# Patient Record
Sex: FEMALE | Race: WHITE | Employment: OTHER
[De-identification: names, ages, dates, MRNs, and addresses within clinical notes are randomized per-mention and may not be internally consistent; named-entity substitution may affect disease eponyms.]

---

## 2017-01-10 PROBLEM — R13.10 DYSPHAGIA, UNSPECIFIED TYPE: Status: ACTIVE | Noted: 2017-01-10

## 2017-01-10 PROBLEM — I35.8 AORTIC VALVE SCLEROSIS: Status: ACTIVE | Noted: 2017-01-10

## 2017-01-20 PROCEDURE — 82746 ASSAY OF FOLIC ACID SERUM: CPT | Performed by: INTERNAL MEDICINE

## 2017-01-20 PROCEDURE — 82607 VITAMIN B-12: CPT | Performed by: INTERNAL MEDICINE

## 2017-03-07 PROCEDURE — 88305 TISSUE EXAM BY PATHOLOGIST: CPT | Performed by: INTERNAL MEDICINE

## 2017-07-06 ENCOUNTER — SURGERY (OUTPATIENT)
Age: 67
End: 2017-07-06

## 2017-07-06 ENCOUNTER — HOSPITAL ENCOUNTER (OUTPATIENT)
Facility: HOSPITAL | Age: 67
Setting detail: HOSPITAL OUTPATIENT SURGERY
Discharge: HOME | End: 2017-07-06
Attending: INTERNAL MEDICINE | Admitting: INTERNAL MEDICINE
Payer: MEDICARE

## 2017-07-06 VITALS
OXYGEN SATURATION: 100 % | HEIGHT: 63 IN | HEART RATE: 74 BPM | DIASTOLIC BLOOD PRESSURE: 84 MMHG | TEMPERATURE: 98 F | RESPIRATION RATE: 16 BRPM | SYSTOLIC BLOOD PRESSURE: 163 MMHG

## 2017-07-06 DIAGNOSIS — R13.10 DYSPHAGIA, UNSPECIFIED TYPE: ICD-10-CM

## 2017-07-06 PROCEDURE — 4A0B8BZ MEASUREMENT OF GASTROINTESTINAL PRESSURE, VIA NATURAL OR ARTIFICIAL OPENING ENDOSCOPIC: ICD-10-PCS | Performed by: INTERNAL MEDICINE

## 2017-07-07 PROCEDURE — 80048 BASIC METABOLIC PNL TOTAL CA: CPT | Performed by: INTERNAL MEDICINE

## 2017-07-07 PROCEDURE — 36415 COLL VENOUS BLD VENIPUNCTURE: CPT | Performed by: INTERNAL MEDICINE

## 2017-07-07 NOTE — H&P
HISTORY AND PHYSICAL FOR ENDOSCOPIC PROCEDURE      Tamar Borjas Patient Status:  [facility] Outpatient Surgery    1950 MRN [id number]   [facility] ENDOSCOPY Attending No att. providers found   [address] Day # 0 PCP Griselda Monte MD flatulence, vomiting blood, bloating, hernia, laxative use, food/milk intolerance, pain with bowel movement, hemorrhoids. General: Denies fatigue, chills/fever, night sweats, weight loss, loss of appetite, weight gain, sleep disturbance.   Neurological: Chelsea Coil loss.  Physical Exam:    Blood pressure (!) 163/84, pulse 74, temperature 98.2 °F (36.8 °C), temperature source Oral, resp. rate 16, height 5' 3\" (1.6 m), SpO2 100 %. General: Appears alert, oriented x3 and in no acute distress.   HEENT: Normal. No neck

## 2017-07-07 NOTE — OPERATIVE REPORT
[facility]                                                                                                        EGD Operative Report    Nain Cao Patient Status:  Hospital Outpatient Finney plans.   [facility]  7/7/2017  2:49 PM

## 2017-07-18 ENCOUNTER — LAB ENCOUNTER (OUTPATIENT)
Dept: LAB | Facility: HOSPITAL | Age: 67
DRG: 470 | End: 2017-07-18
Attending: ORTHOPAEDIC SURGERY
Payer: MEDICARE

## 2017-07-18 DIAGNOSIS — Z01.818 PRE-OP TESTING: ICD-10-CM

## 2017-07-18 LAB
ANTIBODY SCREEN: NEGATIVE
MRSA DNA SPEC QL NAA+PROBE: NEGATIVE
RH BLOOD TYPE: POSITIVE

## 2017-07-18 PROCEDURE — 87641 MR-STAPH DNA AMP PROBE: CPT

## 2017-07-18 PROCEDURE — 86901 BLOOD TYPING SEROLOGIC RH(D): CPT

## 2017-07-18 PROCEDURE — 86850 RBC ANTIBODY SCREEN: CPT

## 2017-07-18 PROCEDURE — 86900 BLOOD TYPING SEROLOGIC ABO: CPT

## 2017-07-18 PROCEDURE — 36415 COLL VENOUS BLD VENIPUNCTURE: CPT

## 2017-07-18 NOTE — H&P
[facility]    PATIENT'S NAME: Maty APARICIO Justin Jane   ATTENDING PHYSICIAN: Julien Kaufman MD   PATIENT ACCOUNT#:   [id number]    [facility]  MEDICAL RECORD #:   [id number]       YOB: 1950  ADMISSION DATE:       07/20 internal and external rotation. Her calves are nontender. She is neurologically intact. The left side is within normal limits.     LABORATORY DATA:  Radiograph, AP pelvis and AP and lateral of the right hip, shows end-stage primary osteoarthritis with ce

## 2017-07-20 ENCOUNTER — APPOINTMENT (OUTPATIENT)
Dept: GENERAL RADIOLOGY | Facility: HOSPITAL | Age: 67
DRG: 470 | End: 2017-07-20
Attending: PHYSICIAN ASSISTANT
Payer: MEDICARE

## 2017-07-20 ENCOUNTER — ANESTHESIA EVENT (OUTPATIENT)
Dept: SURGERY | Facility: HOSPITAL | Age: 67
DRG: 470 | End: 2017-07-20
Payer: MEDICARE

## 2017-07-20 ENCOUNTER — HOSPITAL ENCOUNTER (INPATIENT)
Facility: HOSPITAL | Age: 67
LOS: 2 days | Discharge: HOME | DRG: 470 | End: 2017-07-22
Attending: ORTHOPAEDIC SURGERY | Admitting: ORTHOPAEDIC SURGERY
Payer: MEDICARE

## 2017-07-20 ENCOUNTER — ANESTHESIA (OUTPATIENT)
Dept: SURGERY | Facility: HOSPITAL | Age: 67
DRG: 470 | End: 2017-07-20
Payer: MEDICARE

## 2017-07-20 ENCOUNTER — SURGERY (OUTPATIENT)
Age: 67
End: 2017-07-20

## 2017-07-20 DIAGNOSIS — M16.11 PRIMARY OSTEOARTHRITIS OF RIGHT HIP: ICD-10-CM

## 2017-07-20 DIAGNOSIS — Z01.818 PRE-OP TESTING: Primary | ICD-10-CM

## 2017-07-20 PROCEDURE — 88311 DECALCIFY TISSUE: CPT | Performed by: ORTHOPAEDIC SURGERY

## 2017-07-20 PROCEDURE — 73501 X-RAY EXAM HIP UNI 1 VIEW: CPT | Performed by: PHYSICIAN ASSISTANT

## 2017-07-20 PROCEDURE — 88305 TISSUE EXAM BY PATHOLOGIST: CPT | Performed by: ORTHOPAEDIC SURGERY

## 2017-07-20 PROCEDURE — C9290 INJ, BUPIVACAINE LIPOSOME: HCPCS | Performed by: ORTHOPAEDIC SURGERY

## 2017-07-20 PROCEDURE — 0SR904A REPLACEMENT OF RIGHT HIP JOINT WITH CERAMIC ON POLYETHYLENE SYNTHETIC SUBSTITUTE, UNCEMENTED, OPEN APPROACH: ICD-10-PCS | Performed by: ORTHOPAEDIC SURGERY

## 2017-07-20 PROCEDURE — 94770 HC CARBON DIOXIDE EXPIRED GAS DETERMINATION: CPT

## 2017-07-20 DEVICE — M/L TAP 11 EXT: Type: IMPLANTABLE DEVICE | Site: HIP | Status: FUNCTIONAL

## 2017-07-20 DEVICE — CONT LONG NEU LINER HH 32X50: Type: IMPLANTABLE DEVICE | Site: HIP | Status: FUNCTIONAL

## 2017-07-20 DEVICE — IMPLANTABLE DEVICE: Type: IMPLANTABLE DEVICE | Site: HIP | Status: FUNCTIONAL

## 2017-07-20 DEVICE — BIOLOX® DELTA, CERAMIC FEMORAL HEAD, M, Ø 32/0, TAPER 12/14
Type: IMPLANTABLE DEVICE | Site: HIP | Status: FUNCTIONAL
Brand: BIOLOX® DELTA

## 2017-07-20 RX ORDER — BUPIVACAINE HYDROCHLORIDE 7.5 MG/ML
INJECTION, SOLUTION INTRASPINAL AS NEEDED
Status: DISCONTINUED | OUTPATIENT
Start: 2017-07-20 | End: 2017-07-20 | Stop reason: SURG

## 2017-07-20 RX ORDER — LEVOTHYROXINE SODIUM 0.12 MG/1
125 TABLET ORAL
Status: DISCONTINUED | OUTPATIENT
Start: 2017-07-21 | End: 2017-07-22

## 2017-07-20 RX ORDER — ACETAMINOPHEN 325 MG/1
650 TABLET ORAL ONCE
Status: DISCONTINUED | OUTPATIENT
Start: 2017-07-20 | End: 2017-07-20 | Stop reason: HOSPADM

## 2017-07-20 RX ORDER — DIPHENHYDRAMINE HCL 25 MG
25 CAPSULE ORAL EVERY 4 HOURS PRN
Status: DISCONTINUED | OUTPATIENT
Start: 2017-07-20 | End: 2017-07-22

## 2017-07-20 RX ORDER — CELECOXIB 200 MG/1
200 CAPSULE ORAL 2 TIMES DAILY
Status: DISCONTINUED | OUTPATIENT
Start: 2017-07-21 | End: 2017-07-22

## 2017-07-20 RX ORDER — FAMOTIDINE 20 MG/1
20 TABLET ORAL ONCE
Status: DISCONTINUED | OUTPATIENT
Start: 2017-07-20 | End: 2017-07-20 | Stop reason: HOSPADM

## 2017-07-20 RX ORDER — CLINDAMYCIN PHOSPHATE 900 MG/50ML
900 INJECTION INTRAVENOUS EVERY 8 HOURS
Status: COMPLETED | OUTPATIENT
Start: 2017-07-20 | End: 2017-07-21

## 2017-07-20 RX ORDER — ONDANSETRON 2 MG/ML
4 INJECTION INTRAMUSCULAR; INTRAVENOUS ONCE AS NEEDED
Status: DISCONTINUED | OUTPATIENT
Start: 2017-07-20 | End: 2017-07-20 | Stop reason: HOSPADM

## 2017-07-20 RX ORDER — ASPIRIN 325 MG
325 TABLET ORAL 2 TIMES DAILY
Status: DISCONTINUED | OUTPATIENT
Start: 2017-07-20 | End: 2017-07-22

## 2017-07-20 RX ORDER — SODIUM CHLORIDE 0.9 % (FLUSH) 0.9 %
10 SYRINGE (ML) INJECTION AS NEEDED
Status: DISCONTINUED | OUTPATIENT
Start: 2017-07-20 | End: 2017-07-22

## 2017-07-20 RX ORDER — SODIUM PHOSPHATE, DIBASIC AND SODIUM PHOSPHATE, MONOBASIC 7; 19 G/133ML; G/133ML
1 ENEMA RECTAL ONCE AS NEEDED
Status: DISCONTINUED | OUTPATIENT
Start: 2017-07-20 | End: 2017-07-22

## 2017-07-20 RX ORDER — CLINDAMYCIN PHOSPHATE 900 MG/50ML
900 INJECTION INTRAVENOUS ONCE
Status: DISCONTINUED | OUTPATIENT
Start: 2017-07-20 | End: 2017-07-20 | Stop reason: HOSPADM

## 2017-07-20 RX ORDER — MORPHINE SULFATE 4 MG/ML
4 INJECTION, SOLUTION INTRAMUSCULAR; INTRAVENOUS EVERY 10 MIN PRN
Status: DISCONTINUED | OUTPATIENT
Start: 2017-07-20 | End: 2017-07-20 | Stop reason: HOSPADM

## 2017-07-20 RX ORDER — SODIUM CHLORIDE 9 MG/ML
INJECTION, SOLUTION INTRAVENOUS CONTINUOUS
Status: DISCONTINUED | OUTPATIENT
Start: 2017-07-20 | End: 2017-07-22

## 2017-07-20 RX ORDER — BISACODYL 10 MG
10 SUPPOSITORY, RECTAL RECTAL
Status: DISCONTINUED | OUTPATIENT
Start: 2017-07-20 | End: 2017-07-22

## 2017-07-20 RX ORDER — CLINDAMYCIN PHOSPHATE 150 MG/ML
INJECTION, SOLUTION INTRAVENOUS AS NEEDED
Status: DISCONTINUED | OUTPATIENT
Start: 2017-07-20 | End: 2017-07-20 | Stop reason: SURG

## 2017-07-20 RX ORDER — POLYETHYLENE GLYCOL 3350 17 G/17G
17 POWDER, FOR SOLUTION ORAL DAILY PRN
Status: DISCONTINUED | OUTPATIENT
Start: 2017-07-20 | End: 2017-07-22

## 2017-07-20 RX ORDER — METOCLOPRAMIDE 10 MG/1
10 TABLET ORAL ONCE
Status: DISCONTINUED | OUTPATIENT
Start: 2017-07-20 | End: 2017-07-20 | Stop reason: HOSPADM

## 2017-07-20 RX ORDER — ONDANSETRON 2 MG/ML
4 INJECTION INTRAMUSCULAR; INTRAVENOUS EVERY 4 HOURS PRN
Status: DISCONTINUED | OUTPATIENT
Start: 2017-07-20 | End: 2017-07-22

## 2017-07-20 RX ORDER — HALOPERIDOL 5 MG/ML
0.25 INJECTION INTRAMUSCULAR ONCE AS NEEDED
Status: DISCONTINUED | OUTPATIENT
Start: 2017-07-20 | End: 2017-07-20 | Stop reason: HOSPADM

## 2017-07-20 RX ORDER — DIPHENHYDRAMINE HYDROCHLORIDE 50 MG/ML
25 INJECTION INTRAMUSCULAR; INTRAVENOUS ONCE AS NEEDED
Status: ACTIVE | OUTPATIENT
Start: 2017-07-20 | End: 2017-07-20

## 2017-07-20 RX ORDER — MORPHINE SULFATE 1 MG/ML
INJECTION, SOLUTION EPIDURAL; INTRATHECAL; INTRAVENOUS AS NEEDED
Status: DISCONTINUED | OUTPATIENT
Start: 2017-07-20 | End: 2017-07-20 | Stop reason: SURG

## 2017-07-20 RX ORDER — SODIUM CHLORIDE, SODIUM LACTATE, POTASSIUM CHLORIDE, CALCIUM CHLORIDE 600; 310; 30; 20 MG/100ML; MG/100ML; MG/100ML; MG/100ML
INJECTION, SOLUTION INTRAVENOUS CONTINUOUS
Status: DISCONTINUED | OUTPATIENT
Start: 2017-07-20 | End: 2017-07-22

## 2017-07-20 RX ORDER — ACETAMINOPHEN 325 MG/1
650 TABLET ORAL EVERY 6 HOURS PRN
Status: DISCONTINUED | OUTPATIENT
Start: 2017-07-20 | End: 2017-07-22

## 2017-07-20 RX ORDER — DIPHENHYDRAMINE HYDROCHLORIDE 50 MG/ML
12.5 INJECTION INTRAMUSCULAR; INTRAVENOUS EVERY 4 HOURS PRN
Status: DISCONTINUED | OUTPATIENT
Start: 2017-07-20 | End: 2017-07-22

## 2017-07-20 RX ORDER — GABAPENTIN 300 MG/1
300 CAPSULE ORAL NIGHTLY
Status: DISCONTINUED | OUTPATIENT
Start: 2017-07-20 | End: 2017-07-22

## 2017-07-20 RX ORDER — MORPHINE SULFATE 15 MG/1
15 TABLET, FILM COATED, EXTENDED RELEASE ORAL ONCE
Status: ON HOLD | COMMUNITY
End: 2017-07-22

## 2017-07-20 RX ORDER — HYDROMORPHONE HYDROCHLORIDE 1 MG/ML
0.2 INJECTION, SOLUTION INTRAMUSCULAR; INTRAVENOUS; SUBCUTANEOUS EVERY 5 MIN PRN
Status: DISCONTINUED | OUTPATIENT
Start: 2017-07-20 | End: 2017-07-20 | Stop reason: HOSPADM

## 2017-07-20 RX ORDER — NALOXONE HYDROCHLORIDE 0.4 MG/ML
80 INJECTION, SOLUTION INTRAMUSCULAR; INTRAVENOUS; SUBCUTANEOUS AS NEEDED
Status: DISCONTINUED | OUTPATIENT
Start: 2017-07-20 | End: 2017-07-20 | Stop reason: HOSPADM

## 2017-07-20 RX ORDER — MORPHINE SULFATE 2 MG/ML
2 INJECTION, SOLUTION INTRAMUSCULAR; INTRAVENOUS EVERY 10 MIN PRN
Status: DISCONTINUED | OUTPATIENT
Start: 2017-07-20 | End: 2017-07-20 | Stop reason: HOSPADM

## 2017-07-20 RX ORDER — MIDAZOLAM HYDROCHLORIDE 1 MG/ML
INJECTION INTRAMUSCULAR; INTRAVENOUS AS NEEDED
Status: DISCONTINUED | OUTPATIENT
Start: 2017-07-20 | End: 2017-07-20 | Stop reason: SURG

## 2017-07-20 RX ORDER — HYDROCODONE BITARTRATE AND ACETAMINOPHEN 5; 325 MG/1; MG/1
2 TABLET ORAL AS NEEDED
Status: DISCONTINUED | OUTPATIENT
Start: 2017-07-20 | End: 2017-07-20 | Stop reason: HOSPADM

## 2017-07-20 RX ORDER — HYDROCODONE BITARTRATE AND ACETAMINOPHEN 10; 325 MG/1; MG/1
1 TABLET ORAL EVERY 4 HOURS PRN
Status: DISCONTINUED | OUTPATIENT
Start: 2017-07-21 | End: 2017-07-22

## 2017-07-20 RX ORDER — HYDROCODONE BITARTRATE AND ACETAMINOPHEN 5; 325 MG/1; MG/1
1 TABLET ORAL AS NEEDED
Status: DISCONTINUED | OUTPATIENT
Start: 2017-07-20 | End: 2017-07-20 | Stop reason: HOSPADM

## 2017-07-20 RX ORDER — HYDROMORPHONE HYDROCHLORIDE 1 MG/ML
0.6 INJECTION, SOLUTION INTRAMUSCULAR; INTRAVENOUS; SUBCUTANEOUS EVERY 5 MIN PRN
Status: DISCONTINUED | OUTPATIENT
Start: 2017-07-20 | End: 2017-07-20 | Stop reason: HOSPADM

## 2017-07-20 RX ORDER — METOCLOPRAMIDE HYDROCHLORIDE 5 MG/ML
10 INJECTION INTRAMUSCULAR; INTRAVENOUS EVERY 6 HOURS PRN
Status: DISCONTINUED | OUTPATIENT
Start: 2017-07-20 | End: 2017-07-22

## 2017-07-20 RX ORDER — HYDROCODONE BITARTRATE AND ACETAMINOPHEN 10; 325 MG/1; MG/1
2 TABLET ORAL EVERY 4 HOURS PRN
Status: DISCONTINUED | OUTPATIENT
Start: 2017-07-21 | End: 2017-07-22

## 2017-07-20 RX ORDER — DOCUSATE SODIUM 100 MG/1
100 CAPSULE, LIQUID FILLED ORAL 2 TIMES DAILY
Status: DISCONTINUED | OUTPATIENT
Start: 2017-07-20 | End: 2017-07-22

## 2017-07-20 RX ORDER — MORPHINE SULFATE 4 MG/ML
6 INJECTION, SOLUTION INTRAMUSCULAR; INTRAVENOUS EVERY 10 MIN PRN
Status: DISCONTINUED | OUTPATIENT
Start: 2017-07-20 | End: 2017-07-20 | Stop reason: HOSPADM

## 2017-07-20 RX ORDER — SENNOSIDES 8.6 MG
17.2 TABLET ORAL NIGHTLY
Status: DISCONTINUED | OUTPATIENT
Start: 2017-07-20 | End: 2017-07-22

## 2017-07-20 RX ORDER — HYDROMORPHONE HYDROCHLORIDE 1 MG/ML
0.4 INJECTION, SOLUTION INTRAMUSCULAR; INTRAVENOUS; SUBCUTANEOUS EVERY 5 MIN PRN
Status: DISCONTINUED | OUTPATIENT
Start: 2017-07-20 | End: 2017-07-20 | Stop reason: HOSPADM

## 2017-07-20 RX ADMIN — MORPHINE SULFATE 0.3 MG: 1 INJECTION, SOLUTION EPIDURAL; INTRATHECAL; INTRAVENOUS at 13:25:00

## 2017-07-20 RX ADMIN — BUPIVACAINE HYDROCHLORIDE 1.5 ML: 7.5 INJECTION, SOLUTION INTRASPINAL at 13:25:00

## 2017-07-20 RX ADMIN — MIDAZOLAM HYDROCHLORIDE 2 MG: 1 INJECTION INTRAMUSCULAR; INTRAVENOUS at 13:12:00

## 2017-07-20 RX ADMIN — SODIUM CHLORIDE, SODIUM LACTATE, POTASSIUM CHLORIDE, CALCIUM CHLORIDE: 600; 310; 30; 20 INJECTION, SOLUTION INTRAVENOUS at 14:07:00

## 2017-07-20 RX ADMIN — SODIUM CHLORIDE, SODIUM LACTATE, POTASSIUM CHLORIDE, CALCIUM CHLORIDE: 600; 310; 30; 20 INJECTION, SOLUTION INTRAVENOUS at 13:11:00

## 2017-07-20 RX ADMIN — CLINDAMYCIN PHOSPHATE 900 MG: 150 INJECTION, SOLUTION INTRAVENOUS at 13:25:00

## 2017-07-20 NOTE — BRIEF OP NOTE
Pre-Operative Diagnosis: Primary osteoarthritis of right hip [M16.11]     Post-Operative Diagnosis: Primary osteoarthritis of right hip [M16.11]     Procedure Performed:   Procedure(s):  RIGHT TOTAL HIP ARTHROPLASTY     Surgeon(s) and Role:     * Nir

## 2017-07-20 NOTE — INTERVAL H&P NOTE
Pre-op Diagnosis: Primary osteoarthritis of right hip [M16.11]    The above referenced H&P was reviewed by Thalia Hartman MD on 7/20/2017, the patient was examined and no significant changes have occurred in the patient's condition since the H&P was pe

## 2017-07-20 NOTE — ANESTHESIA PREPROCEDURE EVALUATION
Anesthesia PreOp Note    HPI:     Marcos Falcon is a 78year old female who presents for preoperative consultation requested by: Dean Lamar MD    Date of Surgery: 7/20/2017    Procedure(s):  HIP TOTAL REPLACEMENT  Indication: Primary osteoar AFTER SURGERY Disp: 34 capsule Rfl: 0 7/20/2017 at 0800   gabapentin 300 MG Oral Cap 1 capsule the morning prior to surgery, 1 capsule the morning of surgery, 1 capsule at bedtime each evening after surgery.  Disp: 32 capsule Rfl: 0 7/20/2017 at 0830   Levo file.      Pcn [Penicillins]       Swelling  Seasonal                    Family History   Problem Relation Age of Onset   • Other [OTHER] Father      CVA   • Other [OTHER] Mother      RA   • Other [OTHER] Sister      school fire   • Diabetes Brother    • C negative ROS and normal exam  Exercise tolerance: good    Neuro/Psych - negative ROS     GI/Hepatic/Renal - negative ROS     Endo/Other - negative ROS   Abdominal  - normal exam             Anesthesia Plan:   ASA:  2  Plan:   Spinal and MAC  Monitors and L

## 2017-07-20 NOTE — ANESTHESIA PROCEDURE NOTES
Spinal Block  Performed by: Shelly Gibbons by: Akhil Proctor     Start Time:  7/20/2017 1:15 PM  End Time:  7/20/2017 1:25 PM  Site identification: surface landmarks    Reason for Block: at surgeon's request, post-op pain management and gibbs

## 2017-07-20 NOTE — ANESTHESIA POSTPROCEDURE EVALUATION
Patient:  Kathia Mittal    Procedure Summary     Date:  07/20/17 Room / Location:  [facility] OR  / [facility] MAIN OR    Anesthesia Start:  0581 Anesthesia Stop:  7893    Procedure:  HIP TOTAL REPLACEMENT (Right Hip) Diagnosis:       Primary osteoarthritis

## 2017-07-21 LAB
ANION GAP SERPL CALC-SCNC: 3 MMOL/L (ref 0–18)
BASOPHILS # BLD: 0 K/UL (ref 0–0.2)
BASOPHILS NFR BLD: 0 %
BUN SERPL-MCNC: 12 MG/DL (ref 8–20)
BUN/CREAT SERPL: 17.6 (ref 10–20)
CALCIUM SERPL-MCNC: 8.6 MG/DL (ref 8.5–10.5)
CHLORIDE SERPL-SCNC: 104 MMOL/L (ref 95–110)
CO2 SERPL-SCNC: 29 MMOL/L (ref 22–32)
CREAT SERPL-MCNC: 0.68 MG/DL (ref 0.5–1.5)
EOSINOPHIL # BLD: 0.1 K/UL (ref 0–0.7)
EOSINOPHIL NFR BLD: 1 %
ERYTHROCYTE [DISTWIDTH] IN BLOOD BY AUTOMATED COUNT: 13.3 % (ref 11–15)
GLUCOSE SERPL-MCNC: 103 MG/DL (ref 70–99)
HCT VFR BLD AUTO: 34.7 % (ref 35–48)
HGB BLD-MCNC: 11.7 G/DL (ref 12–16)
LYMPHOCYTES # BLD: 1.5 K/UL (ref 1–4)
LYMPHOCYTES NFR BLD: 25 %
MCH RBC QN AUTO: 32 PG (ref 27–32)
MCHC RBC AUTO-ENTMCNC: 33.6 G/DL (ref 32–37)
MCV RBC AUTO: 95.2 FL (ref 80–100)
MONOCYTES # BLD: 0.6 K/UL (ref 0–1)
MONOCYTES NFR BLD: 10 %
NEUTROPHILS # BLD AUTO: 3.8 K/UL (ref 1.8–7.7)
NEUTROPHILS NFR BLD: 64 %
OSMOLALITY UR CALC.SUM OF ELEC: 282 MOSM/KG (ref 275–295)
PLATELET # BLD AUTO: 181 K/UL (ref 140–400)
PMV BLD AUTO: 8.3 FL (ref 7.4–10.3)
POTASSIUM SERPL-SCNC: 4.3 MMOL/L (ref 3.3–5.1)
RBC # BLD AUTO: 3.64 M/UL (ref 3.7–5.4)
SODIUM SERPL-SCNC: 136 MMOL/L (ref 136–144)
WBC # BLD AUTO: 6 K/UL (ref 4–11)

## 2017-07-21 PROCEDURE — 97162 PT EVAL MOD COMPLEX 30 MIN: CPT

## 2017-07-21 PROCEDURE — 94770 HC CARBON DIOXIDE EXPIRED GAS DETERMINATION: CPT

## 2017-07-21 PROCEDURE — 97116 GAIT TRAINING THERAPY: CPT

## 2017-07-21 PROCEDURE — 80048 BASIC METABOLIC PNL TOTAL CA: CPT | Performed by: INTERNAL MEDICINE

## 2017-07-21 PROCEDURE — 97535 SELF CARE MNGMENT TRAINING: CPT

## 2017-07-21 PROCEDURE — 85025 COMPLETE CBC W/AUTO DIFF WBC: CPT | Performed by: INTERNAL MEDICINE

## 2017-07-21 PROCEDURE — 97165 OT EVAL LOW COMPLEX 30 MIN: CPT

## 2017-07-21 NOTE — PLAN OF CARE
Diabetes/Glucose Control    • Glucose maintained within prescribed range Progressing        DISCHARGE PLANNING    • Discharge to home or other facility with appropriate resources Progressing        HEMATOLOGIC - ADULT    • Free from bleeding injury Neena

## 2017-07-21 NOTE — ANESTHESIA POST-OP FOLLOW-UP NOTE
Ms. Norma Bloom seen POD#1 after receiving Duramorph spinal for right total hip replacement.  States that she is very satisfied with her anesthetic care, was pleased that her anesthesiologist Dr. Jass Hernandez was patient with her when explaining the diffe

## 2017-07-21 NOTE — H&P
[facility]    History and Physical    Amrik Birmingham Patient Status:  Inpatient    1950 MRN [id number]   Location [facility] 4W/SW/SE Attending Liv Ayala MD   Psychiatric Day # 1 PCP Oscar Mckenzie MD     Date:  7 Cap TAKE 2 CAPS THE DAY PRIOR TO SURGERY, 2 CAPS THE MORNING OF SURGERY WITH A SIP OF WATER, 1 CAP DAILY AFTER SURGERY   gabapentin 300 MG Oral Cap 1 capsule the morning prior to surgery, 1 capsule the morning of surgery, 1 capsule at bedtime each evening Pulmonary/Chest: Effort normal and breath sounds normal. No respiratory distress. She has no wheezes. She has no rales. Abdominal: Soft. She exhibits no distension and no mass. There is no tenderness.    Musculoskeletal:   Dry and intact dressing is in

## 2017-07-21 NOTE — PROGRESS NOTES
POD#1 s/p Right SHARAD  Good pain control, No nausea    PE: Dressing clean and dry  Calf's non tender, DNVI    Xray reviewed  Labs stable    Imp: Good early POD#1 recovery s/p Right SHARAD    Plan: DC Christine  Rehab as tolerated with posterior hip precautions  325

## 2017-07-21 NOTE — PHYSICAL THERAPY NOTE
PHYSICAL THERAPY HIP EVALUATION - INPATIENT     Room Number: 407/407-A  Evaluation Date: 7/21/2017  Type of Evaluation: Initial  Physician Order: PT Eval and Treat    Presenting Problem: R SHARAD  Reason for Therapy: Mobility Dysfunction and Discharge Mateon PFO or ASD 1/9/2016   • Bursitis - right hip 6/8/2011   • Disorder of thyroid    • H/O Vitamin D Deficiency 6/8/2011   • Hypothyroid 8/10/2010   • Left ventricular diastolic dysfunction with preserved systolic function 2/5/9233   • Osteoarthritis    • Oste bedclothes, sheets and blankets)?: A Little   -   Sitting down on and standing up from a chair with arms (e.g., wheelchair, bedside commode, etc.): A Little   -   Moving from lying on back to sitting on the side of the bed?: A Little   How much help from a independently   Goal #5   Current Status    Goal #6 Patient independently performs home exercise program for ROM/strengthening per the instructions provided in preparation for discharge.    Goal #6  Current Status

## 2017-07-21 NOTE — OCCUPATIONAL THERAPY NOTE
OCCUPATIONAL THERAPY QUICK EVALUATION - INPATIENT    Room Number: 407/407-A  Evaluation Date: 7/21/2017     Type of Evaluation: Initial       Physician Order: IP Consult to Occupational Therapy  Reason for Therapy:  ADL/IADL Dysfunction and Discharge Plann within functional limits     Upper extremity strength is within functional limits     NEUROLOGICAL FINDINGS                   ACTIVITIES OF DAILY LIVING ASSESSMENT  AM-PAC ‘6-Clicks’ Inpatient Daily Activity Short Form  How much help from another person do with no skilled Occupational Therapy needs at this time. Patient discharged from Occupational Therapy services. Please re-order if a new functional limitation presents during this admission.     Patient was able to achieve the following goals:  Patient ab

## 2017-07-21 NOTE — DISCHARGE PLANNING
JAIME met with the pt. At bedside. The pt. Lives with her  in a 3rd floor condo with elevators. The pt. Reports being independent prior to admission with adls, ambulation and driving. The pt's  also drives. The pt.  Has 2 children both livin

## 2017-07-21 NOTE — PHYSICAL THERAPY NOTE
PHYSICAL THERAPY HIP TREATMENT NOTE - INPATIENT    Room Number: 407/407-A            Presenting Problem: R SHARAD    Problem List  Principal Problem:    Osteoarthritis of hip  Active Problems:    Hypothyroid    Vitamin deficiency      ASSESSMENT   Patient rec bedclothes, sheets and blankets)?: A Little   -   Sitting down on and standing up from a chair with arms (e.g., wheelchair, bedside commode, etc.): A Little   -   Moving from lying on back to sitting on the side of the bed?: A Little   How much help from a #6  Current Status In progress

## 2017-07-21 NOTE — OPERATIVE REPORT
[facility] [place] [place]    PATIENT'S NAME: Maty APARICIO Justin Jane   ATTENDING PHYSICIAN: Julien Manzo MD   OPERATING PHYSICIAN: Julien Manzo MD   PATIENT ACCOUNT#:   [id number]    LOCATION:  [address] [place] [address] #:   [id number]       DATE in a right side superolateral position with all pressure points well padded and the pelvis well stabilized. The right lower extremity was prepped and draped in the usual sterile fashion. Time-out was performed.   The procedure was begun with a 12 cm incis neck cut. The trials were then used and it was found an extended offset plus 0 head recreated the desired leg length and offset while obtaining excellent stability. The trials were then removed.   An M/L taper extended neck size 11 was then impacted into

## 2017-07-22 VITALS
HEIGHT: 64 IN | SYSTOLIC BLOOD PRESSURE: 101 MMHG | DIASTOLIC BLOOD PRESSURE: 51 MMHG | BODY MASS INDEX: 25.41 KG/M2 | OXYGEN SATURATION: 97 % | RESPIRATION RATE: 18 BRPM | TEMPERATURE: 98 F | HEART RATE: 75 BPM | WEIGHT: 148.81 LBS

## 2017-07-22 LAB
ERYTHROCYTE [DISTWIDTH] IN BLOOD BY AUTOMATED COUNT: 13.5 % (ref 11–15)
HCT VFR BLD AUTO: 32.6 % (ref 35–48)
HGB BLD-MCNC: 10.9 G/DL (ref 12–16)
MCH RBC QN AUTO: 31.9 PG (ref 27–32)
MCHC RBC AUTO-ENTMCNC: 33.5 G/DL (ref 32–37)
MCV RBC AUTO: 95.1 FL (ref 80–100)
PLATELET # BLD AUTO: 165 K/UL (ref 140–400)
PMV BLD AUTO: 8.6 FL (ref 7.4–10.3)
RBC # BLD AUTO: 3.42 M/UL (ref 3.7–5.4)
WBC # BLD AUTO: 7.6 K/UL (ref 4–11)

## 2017-07-22 PROCEDURE — 85027 COMPLETE CBC AUTOMATED: CPT | Performed by: PHYSICIAN ASSISTANT

## 2017-07-22 PROCEDURE — 97110 THERAPEUTIC EXERCISES: CPT

## 2017-07-22 PROCEDURE — 97116 GAIT TRAINING THERAPY: CPT

## 2017-07-22 NOTE — DISCHARGE SUMMARY
[facility]    Discharge Summary    Alexandru Lisa Patient Status:  Inpatient    1950 MRN [id number]   Location [facility] 4W/SW/SE Attending Timo Paniagua MD   Hosp Day # 2 PCP Griselda Monte MD     Date of A Quantity:  90 tablet  Refills:  2     MULTIVITAMIN TAB/CAP      Take 1 tablet by mouth daily. Refills:  0     VITAMIN D OR      Take 5,000 Units by mouth daily.    Refills:  0        STOP taking these medications    aspirin 81 MG Tabs        celecoxib 200

## 2017-07-22 NOTE — PROGRESS NOTES
POD#2 s/p Right SHARAD  Good early recovery  Pain well controlled on orals    Labs: stable    PE: Dressing clean and dry  Calf's non tender, DNVI    Imp: ortho stable POD#2 s/p Right SHARAD    Plan: DC home with outpt physical therapy - script written  325 ASA f

## 2017-07-22 NOTE — PHYSICAL THERAPY NOTE
PHYSICAL THERAPY HIP TREATMENT NOTE - INPATIENT      Room Number: 407/407-A     Session:        Presenting Problem: R SHARAD    Problem List  Principal Problem:    Osteoarthritis of hip  Active Problems:    Hypothyroid    Vitamin deficiency      Past Medical a bed to a chair (including a wheelchair)?: None   -   Need to walk in hospital room?: None   -   Climbing 3-5 steps with a railing?: A Little       AM-PAC Score:  Raw Score: 22   PT Approx Degree of Impairment Score: 20.91%   Standardized Score (AM-PAC Sc assistance level: modified independent with rolling walker    Goal #3   Current Status 200ft with rolling walker with Supervision   Goal #4     Goal #4   Current Status     Goal #5 Patient verbalizes and/or demonstrates all precautions and safety concerns

## 2017-07-22 NOTE — PLAN OF CARE
Diabetes/Glucose Control    • Glucose maintained within prescribed range Adequate for Discharge        DISCHARGE PLANNING    • Discharge to home or other facility with appropriate resources Adequate for Discharge        HEMATOLOGIC - ADULT    • Free from b

## 2017-07-26 PROBLEM — Z96.641 S/P HIP REPLACEMENT, RIGHT: Status: ACTIVE | Noted: 2017-07-26

## 2017-07-26 PROBLEM — Z47.89 ORTHOPEDIC AFTERCARE: Status: ACTIVE | Noted: 2017-07-26

## 2017-08-02 PROBLEM — M19.011 PRIMARY OSTEOARTHRITIS OF RIGHT SHOULDER: Status: ACTIVE | Noted: 2017-08-02

## 2018-01-12 PROBLEM — Z12.39 SCREENING FOR BREAST CANCER: Status: ACTIVE | Noted: 2018-01-12

## 2018-01-12 PROBLEM — R13.10 DYSPHAGIA, UNSPECIFIED TYPE: Status: RESOLVED | Noted: 2017-01-10 | Resolved: 2018-01-12

## 2018-01-12 PROBLEM — B35.1 ONYCHOMYCOSIS: Status: ACTIVE | Noted: 2018-01-12

## 2018-01-12 PROBLEM — R00.2 POUNDING HEARTBEAT: Status: ACTIVE | Noted: 2018-01-12

## 2018-01-18 PROBLEM — R59.0 PELVIC LYMPHADENOPATHY: Status: ACTIVE | Noted: 2018-01-18

## 2018-01-18 PROBLEM — R74.8 ELEVATED LIVER ENZYMES: Status: ACTIVE | Noted: 2018-01-18

## 2018-03-09 PROBLEM — R19.09 MASS OF RIGHT INGUINAL REGION: Status: ACTIVE | Noted: 2018-03-09

## 2018-04-09 PROCEDURE — 88304 TISSUE EXAM BY PATHOLOGIST: CPT | Performed by: SURGERY

## 2018-10-02 PROBLEM — L73.8 SEBACEOUS HYPERPLASIA OF FACE: Status: ACTIVE | Noted: 2018-10-02

## 2019-01-08 PROBLEM — Z00.00 ANNUAL PHYSICAL EXAM: Status: ACTIVE | Noted: 2019-01-08

## 2019-01-09 PROBLEM — E78.49 OTHER HYPERLIPIDEMIA: Status: ACTIVE | Noted: 2019-01-09

## 2019-02-07 ENCOUNTER — HOSPITAL ENCOUNTER (OUTPATIENT)
Dept: BONE DENSITY | Facility: HOSPITAL | Age: 69
Discharge: HOME | End: 2019-02-07
Attending: INTERNAL MEDICINE
Payer: MEDICARE

## 2019-02-07 ENCOUNTER — HOSPITAL ENCOUNTER (OUTPATIENT)
Dept: MAMMOGRAPHY | Facility: HOSPITAL | Age: 69
Discharge: HOME | End: 2019-02-07
Attending: INTERNAL MEDICINE
Payer: MEDICARE

## 2019-02-07 DIAGNOSIS — M85.80 OSTEOPENIA, UNSPECIFIED LOCATION: ICD-10-CM

## 2019-02-07 DIAGNOSIS — Z12.39 BREAST CANCER SCREENING: ICD-10-CM

## 2019-02-07 PROCEDURE — 77080 DXA BONE DENSITY AXIAL: CPT | Performed by: INTERNAL MEDICINE

## 2019-02-07 PROCEDURE — 77067 SCR MAMMO BI INCL CAD: CPT | Performed by: INTERNAL MEDICINE

## 2019-02-07 PROCEDURE — 77063 BREAST TOMOSYNTHESIS BI: CPT | Performed by: INTERNAL MEDICINE

## 2019-03-15 ENCOUNTER — HOSPITAL ENCOUNTER (OUTPATIENT)
Dept: CT IMAGING | Age: 69
Discharge: HOME | End: 2019-03-15
Attending: INTERNAL MEDICINE

## 2019-03-15 DIAGNOSIS — Z13.9 SCREENING PROCEDURE: ICD-10-CM

## 2019-03-15 NOTE — PROGRESS NOTES
Pt seen at [facility], [facility] for CTHS:  PRELIMINARY SCORE= 0.0  BP= 152/78  Cholestec labs as follows: Labs from 1/8/19  TC= 249  HDL= 94  LDL= 143  TG= 59  GLUCOSE= 83    Patient to be scheduled for the free PV screening.     All results and risk factors disc

## 2020-01-10 PROBLEM — K64.8 INTERNAL HEMORRHOIDS: Status: ACTIVE | Noted: 2020-01-10

## 2021-01-20 PROBLEM — K64.8 INTERNAL HEMORRHOIDS: Status: RESOLVED | Noted: 2020-01-10 | Resolved: 2021-01-20

## 2021-01-20 PROBLEM — L73.8 SEBACEOUS HYPERPLASIA OF FACE: Status: RESOLVED | Noted: 2018-10-02 | Resolved: 2021-01-20

## 2021-05-21 ENCOUNTER — HOSPITAL ENCOUNTER (OUTPATIENT)
Facility: HOSPITAL | Age: 71
Setting detail: OBSERVATION
Discharge: HOME | End: 2021-05-22
Attending: EMERGENCY MEDICINE | Admitting: INTERNAL MEDICINE
Payer: MEDICARE

## 2021-05-21 ENCOUNTER — APPOINTMENT (OUTPATIENT)
Dept: CT IMAGING | Facility: HOSPITAL | Age: 71
End: 2021-05-21
Payer: MEDICARE

## 2021-05-21 ENCOUNTER — APPOINTMENT (OUTPATIENT)
Dept: GENERAL RADIOLOGY | Facility: HOSPITAL | Age: 71
End: 2021-05-21
Attending: EMERGENCY MEDICINE
Payer: MEDICARE

## 2021-05-21 DIAGNOSIS — R41.82 ALTERED MENTAL STATUS, UNSPECIFIED ALTERED MENTAL STATUS TYPE: Primary | ICD-10-CM

## 2021-05-21 DIAGNOSIS — S09.90XA INJURY OF HEAD, INITIAL ENCOUNTER: ICD-10-CM

## 2021-05-21 DIAGNOSIS — G45.4 TRANSIENT GLOBAL AMNESIA: ICD-10-CM
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
## 2021-05-21 DIAGNOSIS — R41.0 DISORIENTATION: ICD-10-CM

## 2021-05-21 PROCEDURE — 72125 CT NECK SPINE W/O DYE: CPT

## 2021-05-21 PROCEDURE — 71045 X-RAY EXAM CHEST 1 VIEW: CPT | Performed by: EMERGENCY MEDICINE

## 2021-05-21 PROCEDURE — 99204 OFFICE O/P NEW MOD 45 MIN: CPT | Performed by: OTHER

## 2021-05-21 PROCEDURE — 70450 CT HEAD/BRAIN W/O DYE: CPT

## 2021-05-21 PROCEDURE — 73130 X-RAY EXAM OF HAND: CPT | Performed by: EMERGENCY MEDICINE

## 2021-05-21 RX ORDER — ONDANSETRON 2 MG/ML
4 INJECTION INTRAMUSCULAR; INTRAVENOUS EVERY 6 HOURS PRN
Status: DISCONTINUED | OUTPATIENT
Start: 2021-05-21 | End: 2021-05-22

## 2021-05-21 RX ORDER — ATORVASTATIN CALCIUM 80 MG/1
80 TABLET, FILM COATED ORAL NIGHTLY
Status: DISCONTINUED | OUTPATIENT
Start: 2021-05-21 | End: 2021-05-22

## 2021-05-21 RX ORDER — ACETAMINOPHEN 325 MG/1
650 TABLET ORAL EVERY 4 HOURS PRN
Status: DISCONTINUED | OUTPATIENT
Start: 2021-05-21 | End: 2021-05-22

## 2021-05-21 RX ORDER — HYDRALAZINE HYDROCHLORIDE 20 MG/ML
10 INJECTION INTRAMUSCULAR; INTRAVENOUS EVERY 2 HOUR PRN
Status: DISCONTINUED | OUTPATIENT
Start: 2021-05-21 | End: 2021-05-22

## 2021-05-21 RX ORDER — CHOLECALCIFEROL (VITAMIN D3) 125 MCG
1000 CAPSULE ORAL DAILY
Status: DISCONTINUED | OUTPATIENT
Start: 2021-05-21 | End: 2021-05-22

## 2021-05-21 RX ORDER — HYDROCODONE BITARTRATE AND ACETAMINOPHEN 5; 325 MG/1; MG/1
1 TABLET ORAL EVERY 4 HOURS PRN
Status: DISCONTINUED | OUTPATIENT
Start: 2021-05-21 | End: 2021-05-22

## 2021-05-21 RX ORDER — MELATONIN
3 NIGHTLY PRN
Status: DISCONTINUED | OUTPATIENT
Start: 2021-05-21 | End: 2021-05-22

## 2021-05-21 RX ORDER — FOLIC ACID 1 MG/1
1 TABLET ORAL DAILY
Status: DISCONTINUED | OUTPATIENT
Start: 2021-05-21 | End: 2021-05-22

## 2021-05-21 RX ORDER — LABETALOL HYDROCHLORIDE 5 MG/ML
10 INJECTION, SOLUTION INTRAVENOUS EVERY 10 MIN PRN
Status: DISCONTINUED | OUTPATIENT
Start: 2021-05-21 | End: 2021-05-22

## 2021-05-21 RX ORDER — DOCUSATE SODIUM 100 MG/1
100 CAPSULE, LIQUID FILLED ORAL 2 TIMES DAILY PRN
Status: DISCONTINUED | OUTPATIENT
Start: 2021-05-21 | End: 2021-05-22

## 2021-05-21 RX ORDER — ACETAMINOPHEN 650 MG/1
650 SUPPOSITORY RECTAL EVERY 4 HOURS PRN
Status: DISCONTINUED | OUTPATIENT
Start: 2021-05-21 | End: 2021-05-22

## 2021-05-21 RX ORDER — HYDROCODONE BITARTRATE AND ACETAMINOPHEN 5; 325 MG/1; MG/1
2 TABLET ORAL EVERY 4 HOURS PRN
Status: DISCONTINUED | OUTPATIENT
Start: 2021-05-21 | End: 2021-05-22

## 2021-05-21 RX ORDER — ASPIRIN 81 MG/1
81 TABLET, CHEWABLE ORAL ONCE
Status: COMPLETED | OUTPATIENT
Start: 2021-05-21 | End: 2021-05-21

## 2021-05-21 RX ORDER — HEPARIN SODIUM 5000 [USP'U]/ML
5000 INJECTION, SOLUTION INTRAVENOUS; SUBCUTANEOUS EVERY 12 HOURS SCHEDULED
Status: DISCONTINUED | OUTPATIENT
Start: 2021-05-21 | End: 2021-05-22

## 2021-05-21 RX ORDER — METOCLOPRAMIDE HYDROCHLORIDE 5 MG/ML
10 INJECTION INTRAMUSCULAR; INTRAVENOUS EVERY 8 HOURS PRN
Status: DISCONTINUED | OUTPATIENT
Start: 2021-05-21 | End: 2021-05-22

## 2021-05-21 RX ORDER — ASPIRIN 81 MG/1
81 TABLET, CHEWABLE ORAL DAILY
Status: DISCONTINUED | OUTPATIENT
Start: 2021-05-22 | End: 2021-05-22

## 2021-05-21 RX ORDER — MELATONIN
100 3 TIMES DAILY
Status: DISCONTINUED | OUTPATIENT
Start: 2021-05-21 | End: 2021-05-22

## 2021-05-21 NOTE — H&P
[facility] Hospitalist H&P       CC: Fall, transient memory loss    PCP: Fabricio Gilbert MD    History of Present Illness:   69year old female with history of hypothyroidism, who presents to the hospital for evaluation of transient memory loss after having UPPER GI ENDOSCOPY,DIAGNOSIS  6/15/2012    Kristin Listen. NO REID'S, no repeat rec'd.        ALL:    Morphine                HALLUCINATION  Pcn [Penicillins]       SWELLING  Seasonal                     Home Medications: reviewed with patient     Soc Hx  Social AM     Finalized by (CST): Nasim rTan MD on 5/21/2021 at 10:28 AM          CT SPINE CERVICAL (CPT=72125)    Result Date: 5/21/2021  CONCLUSION:   Degenerative disc, facet, uncovertebral joint disease throughout the cervical spine without acute fracture orbital or in sinus region.  Ok to hold off on xray     Hypothyroidism  -check TSH  -on synthorid daily     DVT prophylaxis: low risk   Code status: full     Disposition: observation     Michael Bob DO  [facility] Hospitalist   [facility] Answering service [phone]

## 2021-05-21 NOTE — ED PROVIDER NOTES
Patient Seen in: [facility] 3w/sw      History   Patient presents with:  Altered Mental Status    Stated Complaint: fall hit face     HPI/Subjective:   HPI    Patient presents to the emergency department after falling and is now confused.   Ezra Forman Vitals [05/21/21 0847]   BP (!) 184/122   Pulse 100   Resp 18   Temp 97.1 °F (36.2 °C)   Temp src Temporal   SpO2 99 %   O2 Device None (Room air)       Current:/61 (BP Location: Right arm)   Pulse 69   Temp 98.7 °F (37.1 °C) (Oral)   Resp 18   Ht 16 within normal limits   CBC W/ DIFFERENTIAL - Abnormal; Notable for the following components:    .3 (*)     RDW-SD 54.6 (*)     All other components within normal limits   PROTHROMBIN TIME (PT) - Normal   TROPONIN I - Normal   VITAMIN B12 - Normal diffuse bone demineralization. No evidence for acute fracture or radiopaque retained foreign body.     Dictated by (CST): Matteo Luciano MD on 5/21/2021 at 10:25 AM     Finalized by (CST): Matteo Luciano MD on 5/21/2021 at 10:28 AM          [facility] department. Local wound care given to skin abrasions. Will get MRI from the floor.       Critical Care Documentation    There were greater than 30 minutes of critical care time spent directly on this patient and this time did not include procedures but di

## 2021-05-21 NOTE — ED INITIAL ASSESSMENT (HPI)
Horacio Calderón arrives ambulatory to room 47 status post fall. Patient is normally AOX4, very active and independent . She called her daughter a few times this morning around 0815 telling her that she fell. She admits to not remembering how or why she fell.  Do

## 2021-05-21 NOTE — PLAN OF CARE
Jeffy Ekatreinadon from home with , while out walking fell and does not recall what happened, rule out CVA Aox3, sometimes forgetful, RA, bed alarm on, call light in reach, plan MRI and EEG.           Problem: Patient Centered Care  Goal: Patient preferences Encourage patient/family to participate in care and decision-making at the level they choose  - Honor patient and family perspectives and choices  Outcome: Progressing     Problem: Patient Centered Care  Goal: Patient preferences are identified and Nashleobardo Herrerayer to participate in care and decision-making at the level they choose  - Honor patient and family perspectives and choices  Outcome: Progressing     Problem: Patient Centered Care  Goal: Patient preferences are identified and integrated in the patient's plan seizure activity  Description: INTERVENTIONS:  - Maintain airway, patient safety  and administer oxygen as ordered  - Monitor patient for seizure activity, document and report duration and description of seizure to MD/LIP  - If seizure occurs, turn patient activity based on assessment  Outcome: Progressing  Goal: Achieves maximal functionality and self care  Description: INTERVENTIONS  - Monitor swallowing and airway patency with patient fatigue and changes in neurological status  - Encourage and assist karyn ADULT  Goal: Achieves stable or improved neurological status  Description: INTERVENTIONS  - Assess for and report changes in neurological status  - Initiate measures to prevent increased intracranial pressure  - Maintain blood pressure and fluid volume wit

## 2021-05-21 NOTE — ED QUICK NOTES
Orders for admission, patient is aware of plan and ready to go upstairs. Any questions, please call ED TRINH Romo at extension [phone].    Type of COVID test sent: rapid  COVID Suspicion level:neg Low    Titratable drug(s) infusing:n/a  Rate:n/a    LOC at time

## 2021-05-21 NOTE — CONSULTS
[facility]  [address] [facility], [address] [place] [address]  [phone]          [place] NOTE       [facility]    Report of Consultation    Laci Duong Patient Status:  Mignon Garvey • Ergocalciferol (VITAMIN D OR) Take 5,000 Units by mouth daily. • Calcium Carbonate (CALCIUM 600) 600 MG Oral Tab Take 600 mg by mouth. OUTPATIENT MEDICATIONS  No current facility-administered medications on file prior to encounter.   Levot Years of education: Not on file      Highest education level: Not on file    Occupational History      Occupation: retired     Tobacco Use      Smoking status: Never Smoker      Smokeless tobacco: Never Used    Vaping Use      Vaping Use: N intact to light touch  Coordination: Finger-to- nose-finger intact bilaterally   Gait: not assessed    LABS/DATA:  Component      Latest Ref Rng & Units 5/21/2021   WBC      4.0 - 11.0 x10(3) uL 5.3   RBC      3.80 - 5.30 x10(6)uL 3.98   Hemoglobin      12 microvascular ischemic change and atrophy with large vessel atherosclerosis    Patient's clinical history is concerning for 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

## 2021-05-22 ENCOUNTER — APPOINTMENT (OUTPATIENT)
Dept: MRI IMAGING | Facility: HOSPITAL | Age: 71
End: 2021-05-22
Attending: Other
Payer: MEDICARE

## 2021-05-22 VITALS
BODY MASS INDEX: 26.42 KG/M2 | WEIGHT: 149.13 LBS | TEMPERATURE: 98 F | HEART RATE: 60 BPM | RESPIRATION RATE: 18 BRPM | SYSTOLIC BLOOD PRESSURE: 156 MMHG | OXYGEN SATURATION: 100 % | DIASTOLIC BLOOD PRESSURE: 65 MMHG | HEIGHT: 63 IN

## 2021-05-22 PROCEDURE — 99213 OFFICE O/P EST LOW 20 MIN: CPT | Performed by: OTHER

## 2021-05-22 PROCEDURE — 70553 MRI BRAIN STEM W/O & W/DYE: CPT | Performed by: OTHER

## 2021-05-22 RX ORDER — LEVOTHYROXINE SODIUM 112 UG/1
112 TABLET ORAL
Status: DISCONTINUED | OUTPATIENT
Start: 2021-05-22 | End: 2021-05-22

## 2021-05-22 RX ORDER — MELATONIN
100 3 TIMES DAILY
Qty: 90 TABLET | Refills: 0 | Status: SHIPPED | OUTPATIENT
Start: 2021-05-22 | End: 2021-07-07

## 2021-05-22 RX ORDER — FOLIC ACID 1 MG/1
1 TABLET ORAL DAILY
Qty: 30 TABLET | Refills: 0 | Status: SHIPPED | COMMUNITY
Start: 2021-05-23 | End: 2021-07-07

## 2021-05-22 RX ORDER — ASPIRIN 81 MG/1
81 TABLET, CHEWABLE ORAL DAILY
Qty: 30 TABLET | Refills: 0 | Status: SHIPPED | COMMUNITY
Start: 2021-05-22 | End: 2021-05-22 | Stop reason: CLARIF

## 2021-05-22 NOTE — DISCHARGE SUMMARY
General Medicine Discharge Summary     Patient ID:  Helio Person  69year old  9/26/1950    Admit date: 5/21/2021    Discharge date and time: 5/22/2021    Attending Physician: lAl Avila, at noon, and at bedtime. CONTINUE these medications which have NOT CHANGED    aspirin 81 MG Oral Chew Tab  Chew 1 tablet (81 mg total) by mouth daily.     Levothyroxine Sodium 112 MCG Oral Tab  Take 112 mcg daily    lidocaine-prilocaine 2.5-2.5 % Exter

## 2021-05-22 NOTE — PLAN OF CARE
Pt came in due to having a fall. Pt does not remember what exactly occurred. Pt stated a bystander picked her up and took her to the hospital. Q2 neuro's cont until 0600 05/22. A&Ox4, RA, VSS. Q6 ACHS. CT negative. MRI exhibits no evidence of stroke.  Bed l (call light)  - Provide an  as needed  - Communicate barriers and strategies to overcome with those who interact with patient  Outcome: Progressing     Problem: NEUROLOGICAL - ADULT  Goal: Achieves stable or improved neurological status  Descrip

## 2021-05-22 NOTE — PROGRESS NOTES
[facility]  [address] [facility], [address] [place] [address]  [phone]        INPATIENT NEUROLOGY   FOLLOW UP PROGRESS NOTE  [address] Patient Status:  Observation     1950 MRN [id number] Therapeutics:  1. Okay to discharge home  2. No seizure restrictions. 2. Cerebrovascular disease; white matter changes on MRI  Differential Diagnosis:       Plan    Diagnostics:  1. Given left atrial dilation, consider 30-day event monitor.   Defer to [facility] Medical History No    Reviewed the MRI with the patient and her daughter at the bedside. Explained the definition of small vessel disease. Used layman's terms. Explained importance of checking her blood pressure twice a day.   Explained there is no indic constricting to 3mm and equally round and reactive to light  in a well lit room. EOMI. No nystagmus. No ptosis. V1-V3 intact B/L to light touch. No pathological facial asymmetry. No flattening of the nasolabial fold.  While there does appear to be asymmetry 01/20/2021    INR 0.97 05/21/2021    PTP 12.7 05/21/2021    T4F 2.05 (H) 01/10/2020    TSH 0.500 05/21/2021    CRP 2.8 04/26/2011    TROP <0.045 05/21/2021    B12 757 05/21/2021     Recent Results (from the past 72 hour(s))   POCT GLUCOSE    Collection Teo Moseley 54.6 (H) 35.1 - 46.3 fL    RDW 14.8 11.0 - 15.0 %    .0 150.0 - 450.0 10(3)uL    Neutrophil Absolute Prelim 3.70 1.50 - 7.70 x10 (3) uL    Neutrophil Absolute 3.70 1.50 - 7.70 x10(3) uL    Lymphocyte Absolute 1.09 1.00 - 4.00 x10(3) uL    Monocyte A Range    POC Glucose  86 70 - 99 mg/dL   POCT GLUCOSE    Collection Time: 05/22/21  5:24 AM   Result Value Ref Range    POC Glucose  91 70 - 99 mg/dL   RAINBOW DRAW LAVENDER    Collection Time: 05/22/21  5:57 AM   Result Value Ref Range    Hold Lavender Au Senescent changes of parenchymal volume loss with sequela of chronic microvascular ischemic disease. There is also large vessel atherosclerosis. 3. Chronic subtotal left mastoid effusion; correlation for relevant symptoms is suggested.    4. Lesser inciden

## 2021-05-22 NOTE — PLAN OF CARE
Problem: Patient Centered Care  Goal: Patient preferences are identified and integrated in the patient's plan of care  Description: Interventions:  - What would you like us to know as we care for you?  I was out power walking when I fell and I don't remem INTERVENTIONS  - Assess for and report changes in neurological status  - Initiate measures to prevent increased intracranial pressure  - Maintain blood pressure and fluid volume within ordered parameters to optimize cerebral perfusion and minimize risk of

## 2021-06-01 ENCOUNTER — HOSPITAL ENCOUNTER (OUTPATIENT)
Dept: ELECTROPHYSIOLOGY | Facility: HOSPITAL | Age: 71
Discharge: HOME | End: 2021-06-01
Attending: Other
Payer: MEDICARE

## 2021-06-01 PROCEDURE — 95819 EEG AWAKE AND ASLEEP: CPT | Performed by: OTHER

## 2021-06-02 NOTE — PROCEDURES
[address]  [facility], [address]      PATIENT'S NAME: Janenegregory Iraj [place]   ATTENDING PHYSICIAN: Pina Mcfadden DO   PATIENT ACCOUNT #: [de-identified] LOCATION: [facility]   MEDICAL RECORD #: [id number] DATE OF BIRTH: [birth year]

## 2021-06-30 ENCOUNTER — OFFICE VISIT (OUTPATIENT)
Dept: NEUROLOGY | Facility: CLINIC | Age: 71
End: 2021-06-30
Payer: MEDICARE

## 2021-06-30 VITALS
WEIGHT: 150 LBS | BODY MASS INDEX: 26.58 KG/M2 | HEART RATE: 76 BPM | DIASTOLIC BLOOD PRESSURE: 86 MMHG | SYSTOLIC BLOOD PRESSURE: 146 MMHG | HEIGHT: 63 IN

## 2021-06-30 DIAGNOSIS — G45.4 TGA (TRANSIENT GLOBAL AMNESIA): Primary | ICD-10-CM

## 2021-06-30 PROCEDURE — 3079F DIAST BP 80-89 MM HG: CPT | Performed by: OTHER

## 2021-06-30 PROCEDURE — 3008F BODY MASS INDEX DOCD: CPT | Performed by: OTHER

## 2021-06-30 PROCEDURE — 99213 OFFICE O/P EST LOW 20 MIN: CPT | Performed by: OTHER

## 2021-06-30 PROCEDURE — 3077F SYST BP >= 140 MM HG: CPT | Performed by: OTHER

## 2021-06-30 NOTE — PATIENT INSTRUCTIONS
-Please stop folic acid, thiamine and B12 deficiency   -Follow up as needed.     -If you develop sudden onset loss of vision, double vision, room spinning/world spinning sensation, inability to speak or understand others who are speaking to you, slurred spe

## 2021-06-30 NOTE — PROGRESS NOTES
[facility] 37  [address] [facility], [address] [place] [address]  [phone]          Hospital Discharge  Follow Up Visit       Date: June 30, 2021  Patient Name: Levy Rivera   MRN: [id number]  Primary physician: Norberto Lei kg/m²   General appearance: Well appearing, and in no acute distress  Skin: skin color, texture normal.  No rashes or lesions. Head: Normocephalic, atraumatic.     Neurological exam:    Mental Status:   Attention/Concentration: intact attention on bedsid 6/1/2021: Normal   –Normal niacin, TSH, B12/MMA   –Elevated thiamine   –MRI brain: mild generalized atrophy; moderate white matter ischemic changes; chronic left mastoid effusion -I reviewed the images with patient  –     Transient Global Amnesia  W results as well as further testing and medications required. This note was prepared using [employer] voice recognition dictation software and as a result, errors may occur. When identified, these errors have been corrected.  While every attempt is ma

## 2021-08-05 PROBLEM — R55 SYNCOPE AND COLLAPSE: Status: ACTIVE | Noted: 2021-08-05

## 2022-01-21 PROBLEM — R41.82 ALTERED MENTAL STATUS, UNSPECIFIED ALTERED MENTAL STATUS TYPE: Status: RESOLVED | Noted: 2021-05-21 | Resolved: 2022-01-21

## 2022-01-21 PROBLEM — S09.90XA INJURY OF HEAD, INITIAL ENCOUNTER: Status: RESOLVED | Noted: 2021-05-21 | Resolved: 2022-01-21
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
## 2022-01-21 PROBLEM — I35.0 AORTIC VALVE STENOSIS, ETIOLOGY OF CARDIAC VALVE DISEASE UNSPECIFIED: Status: ACTIVE | Noted: 2022-01-21

## 2022-01-21 PROBLEM — R41.82 ALTERED MENTAL STATUS: Status: RESOLVED | Noted: 2021-05-21 | Resolved: 2022-01-21

## 2022-01-21 PROBLEM — R55 SYNCOPE AND COLLAPSE: Status: RESOLVED | Noted: 2021-08-05 | Resolved: 2022-01-21

## 2022-04-29 NOTE — PROGRESS NOTES
[facility]    OUTPATIENT PHYSICAL THERAPY  PLAN OF TREATMENT FOR OUTPATIENT REHABILITATION AND PROGRESS NOTE           Patient's Last Name, First Name, Radha Ruiz Date of Birth  1974   Provider's Name  [facility] Medical Record No.  [id number]    Onset Date  09/27/21 (went to [facility] on 9-) Start of Care Date  11-   Type:     _X_PT   ___OT   ___SLP Medical Diagnosis  Chronic neck pain M54.2, G89.29 Chronic midline low back pain without sciatica M54.50, G89.29    PT Diagnosis  Neck and back pain with positive SIJ tests Plan of Treatment  Frequency/Duration: 1-2 times per week x 6 weeks  Certification date from 4-  to 6-9-2022     Goals:  Goal Identifier Walking   Goal Description Linette will be able to walk on level and stairs with railings as needed x 15 minutes with minimal increase in symptoms for getting round the community and her home   Target Date 04/20/22   Date Met      Progress (detail required for progress note): more difficult since she fell     Goal Identifier Driving   Goal Description Linette will be able to turn her head to look over shoulders when driving for safety as noted with increase of cervical rotation to 40 degrees (has not been driving x 2 w, last reported some ease w/rot)   Target Date 04/30/22   Date Met      Progress (detail required for progress note): Some days is better and other days not as good. Better backing car up.     Goal Identifier Home program   Goal Description Linette will be able to complete her home program as instructed to improve her strength, stabilization and ROM to improve her function as noted with a 20 point improvement on NDI and ALBERT with MICHELLE decreasing to medium   Target Date 03/30/22   Date Met      Progress (detail required for progress note): Working  Pt was not in room at time of visit, pt was at imaging. Pt's daughter Margorie Essex was in the room. Daughter thanked this  for visit, indicated that she would like to be alone at the moment. Will continue to monitor.      05/21/21 0932   Clinical Enc on home program, thinks they are helpful.     Goal Identifier Back pain   Goal Description Linette would like to get rid of her back pain so she can do everything   Target Date 08/31/22   Date Met      Progress (detail required for progress note): Personal goal and she has started core and posture work to progess toward this       Beginning/End Dates of Progress Note Reporting Period:  5 sessions between 4-6-2022 and 4- for a total of 17 sessions    Progress Toward Goals:   Progress this reporting period: Linette feels the treatment is helpful and applies the recommendation for posture and positioning. She has delayed onset of symptoms so she is encouraged to continue pacing and use graded exposure to increase activity. There is tightness in her upper and middle cervical spine and she has improvement in her L deviation with neck flexion and extension.    Client Self (Subjective) Report for Progress Note Reporting Period: Bad ache and burning R mid to low back. Continues to have pain with activities and symptoms with delayed onset.    Objective Measurements:   Objective Measure: Symptoms  Details: Neck: 3/10 suboccipital base; R mid back: 6/10; Low back: 4/10  Objective Measure: Seated cervical AROM  Details: 85% with L deviation; 30% L deviation; Rotation: 50% bilaterally stretching with end range pain noted;  Objective Measure: Low back AROM  Details: 70% with pain in the lower lumbnar and  sacral area through motion; minimal loss with same pattern as flexion.            I CERTIFY THE NEED FOR THESE SERVICES FURNISHED UNDER        THIS PLAN OF TREATMENT AND WHILE UNDER MY CARE .             Physician Signature               Date    X_____________________________________________________                      Referring Provider: Wood Peacock, PT

## 2022-05-16 ENCOUNTER — NURSE ONLY (OUTPATIENT)
Dept: INTERNAL MEDICINE CLINIC | Facility: HOSPITAL | Age: 72
End: 2022-05-16
Attending: EMERGENCY MEDICINE

## 2022-05-16 DIAGNOSIS — Z00.00 WELLNESS EXAMINATION: Primary | ICD-10-CM

## 2022-05-16 LAB
RUBV IGG SER QL: POSITIVE
RUBV IGG SER-ACNC: >500 IU/ML (ref 10–?)

## 2022-05-16 PROCEDURE — 86735 MUMPS ANTIBODY: CPT

## 2022-05-16 PROCEDURE — 86765 RUBEOLA ANTIBODY: CPT

## 2022-05-16 PROCEDURE — 86787 VARICELLA-ZOSTER ANTIBODY: CPT

## 2022-05-16 PROCEDURE — 86480 TB TEST CELL IMMUN MEASURE: CPT

## 2022-05-16 PROCEDURE — 86762 RUBELLA ANTIBODY: CPT

## 2022-05-18 LAB
M TB IFN-G CD4+ T-CELLS BLD-ACNC: 0.02 IU/ML
M TB TUBERC IFN-G BLD QL: NEGATIVE
M TB TUBERC IGNF/MITOGEN IGNF CONTROL: >10 IU/ML
MEV IGG SER-ACNC: >300 AU/ML (ref 16.5–?)
MUV IGG SER IA-ACNC: >300 AU/ML (ref 11–?)
QFT TB1 AG MINUS NIL: 0 IU/ML
QFT TB2 AG MINUS NIL: -0.01 IU/ML
VZV IGG SER IA-ACNC: 3659 (ref 165–?)

## 2023-04-06 ENCOUNTER — LAB ENCOUNTER (OUTPATIENT)
Dept: LAB | Facility: HOSPITAL | Age: 73
End: 2023-04-06
Attending: PODIATRIST
Payer: MEDICARE

## 2023-04-06 DIAGNOSIS — E55.9 VITAMIN D DEFICIENCY: Primary | ICD-10-CM

## 2023-04-06 LAB — VIT D+METAB SERPL-MCNC: 64.7 NG/ML (ref 30–100)

## 2023-04-06 PROCEDURE — 82306 VITAMIN D 25 HYDROXY: CPT

## 2023-04-06 PROCEDURE — 36415 COLL VENOUS BLD VENIPUNCTURE: CPT

## 2023-10-30 ENCOUNTER — OFFICE VISIT (OUTPATIENT)
Dept: FAMILY MEDICINE CLINIC | Facility: CLINIC | Age: 73
End: 2023-10-30

## 2023-10-30 VITALS
RESPIRATION RATE: 16 BRPM | WEIGHT: 154 LBS | TEMPERATURE: 98 F | SYSTOLIC BLOOD PRESSURE: 122 MMHG | DIASTOLIC BLOOD PRESSURE: 78 MMHG | HEIGHT: 63 IN | OXYGEN SATURATION: 97 % | BODY MASS INDEX: 27.29 KG/M2 | HEART RATE: 65 BPM

## 2023-10-30 DIAGNOSIS — J02.9 VIRAL PHARYNGITIS: Primary | ICD-10-CM

## 2023-10-30 DIAGNOSIS — J02.9 SORE THROAT: ICD-10-CM

## 2023-10-30 DIAGNOSIS — J06.9 VIRAL URI: ICD-10-CM

## 2023-10-30 LAB
CONTROL LINE PRESENT WITH A CLEAR BACKGROUND (YES/NO): YES YES/NO
KIT LOT #: NORMAL NUMERIC
STREP GRP A CUL-SCR: NEGATIVE

## 2023-10-30 PROCEDURE — 1160F RVW MEDS BY RX/DR IN RCRD: CPT | Performed by: NURSE PRACTITIONER

## 2023-10-30 PROCEDURE — 3008F BODY MASS INDEX DOCD: CPT | Performed by: NURSE PRACTITIONER

## 2023-10-30 PROCEDURE — 87880 STREP A ASSAY W/OPTIC: CPT | Performed by: NURSE PRACTITIONER

## 2023-10-30 PROCEDURE — 1159F MED LIST DOCD IN RCRD: CPT | Performed by: NURSE PRACTITIONER

## 2023-10-30 PROCEDURE — 99213 OFFICE O/P EST LOW 20 MIN: CPT | Performed by: NURSE PRACTITIONER

## 2023-10-30 PROCEDURE — 3074F SYST BP LT 130 MM HG: CPT | Performed by: NURSE PRACTITIONER

## 2023-10-30 PROCEDURE — 3078F DIAST BP <80 MM HG: CPT | Performed by: NURSE PRACTITIONER

## 2023-10-30 RX ORDER — BENZONATATE 100 MG/1
100 CAPSULE ORAL 3 TIMES DAILY PRN
Qty: 15 CAPSULE | Refills: 0 | Status: SHIPPED | OUTPATIENT
Start: 2023-10-30 | End: 2023-11-04

## 2023-11-02 ENCOUNTER — OFFICE VISIT (OUTPATIENT)
Dept: FAMILY MEDICINE CLINIC | Facility: CLINIC | Age: 73
End: 2023-11-02
Payer: COMMERCIAL

## 2023-11-02 VITALS
DIASTOLIC BLOOD PRESSURE: 78 MMHG | BODY MASS INDEX: 27.29 KG/M2 | TEMPERATURE: 98 F | WEIGHT: 154 LBS | OXYGEN SATURATION: 97 % | RESPIRATION RATE: 16 BRPM | SYSTOLIC BLOOD PRESSURE: 118 MMHG | HEIGHT: 63 IN | HEART RATE: 71 BPM

## 2023-11-02 DIAGNOSIS — J40 BRONCHITIS: Primary | ICD-10-CM

## 2023-11-02 PROCEDURE — 3008F BODY MASS INDEX DOCD: CPT | Performed by: NURSE PRACTITIONER

## 2023-11-02 PROCEDURE — 1159F MED LIST DOCD IN RCRD: CPT | Performed by: NURSE PRACTITIONER

## 2023-11-02 PROCEDURE — 3078F DIAST BP <80 MM HG: CPT | Performed by: NURSE PRACTITIONER

## 2023-11-02 PROCEDURE — 1160F RVW MEDS BY RX/DR IN RCRD: CPT | Performed by: NURSE PRACTITIONER

## 2023-11-02 PROCEDURE — 99213 OFFICE O/P EST LOW 20 MIN: CPT | Performed by: NURSE PRACTITIONER

## 2023-11-02 PROCEDURE — 3074F SYST BP LT 130 MM HG: CPT | Performed by: NURSE PRACTITIONER

## 2023-11-02 RX ORDER — BENZONATATE 200 MG/1
200 CAPSULE ORAL 3 TIMES DAILY PRN
Qty: 15 CAPSULE | Refills: 0 | Status: SHIPPED | OUTPATIENT
Start: 2023-11-02 | End: 2023-11-07

## 2023-11-02 RX ORDER — ALBUTEROL SULFATE 90 UG/1
2 AEROSOL, METERED RESPIRATORY (INHALATION) EVERY 4 HOURS PRN
Qty: 1 EACH | Refills: 0 | Status: SHIPPED | OUTPATIENT
Start: 2023-11-02

## 2024-03-06 ENCOUNTER — OFFICE VISIT (OUTPATIENT)
Dept: DERMATOLOGY CLINIC | Facility: CLINIC | Age: 74
End: 2024-03-06
Payer: COMMERCIAL

## 2024-03-06 DIAGNOSIS — D23.9 BENIGN NEOPLASM OF SKIN, UNSPECIFIED LOCATION: ICD-10-CM

## 2024-03-06 DIAGNOSIS — L82.1 SK (SEBORRHEIC KERATOSIS): ICD-10-CM

## 2024-03-06 DIAGNOSIS — L81.4 LENTIGO: ICD-10-CM

## 2024-03-06 DIAGNOSIS — L82.0 INFLAMED SEBORRHEIC KERATOSIS: Primary | ICD-10-CM

## 2024-03-06 DIAGNOSIS — D22.9 MULTIPLE NEVI: ICD-10-CM

## 2024-03-06 PROCEDURE — 99203 OFFICE O/P NEW LOW 30 MIN: CPT | Performed by: DERMATOLOGY

## 2024-03-06 RX ORDER — ACETAMINOPHEN 100MG/10ML
SYRINGE (ML) INTRAVENOUS
COMMUNITY
Start: 2023-04-01

## 2024-03-17 NOTE — PROGRESS NOTES
So Wilde is a 73 year old female.  HPI:     CC:    Chief Complaint   Patient presents with    Full Skin Exam     New pt presents for full body skin exam. No family or personal hx of skin cancer. Two spots of concern today.   Left side of neck - reoccurring within last 2-3 years- tx w/ LN2 in the past.    Right temple- 2-3 weeks- raised, bumpy discoloration- no tx.            HISTORY:    Past Medical History:   Diagnosis Date    (+) Bubble Study - PFO or ASD 2016    Bursitis - right hip 2011    Disorder of thyroid     H/O Vitamin D Deficiency 2011    Hypothyroid 8/10/2010    Left ventricular diastolic dysfunction with preserved systolic function 8/3/2016    Osteoarthritis     Osteopenia 8/10/2010    Visual impairment       Past Surgical History:   Procedure Laterality Date    APPENDECTOMY      COLONOSCOPY          COLONOSCOPY  2019    hemorrhoids    COLONOSCOPY,DIAGNOSTIC  06/15/2012    [facility]. Hemorrhoids, repeat .    EGD  2017    ESOPHAGEAL MANOMETRY - INTERNAL  2017    borderline nutcracker vs early achalasia    FOOT SURGERY Left     all toes were operated on    HIP REPLACEMENT SURGERY Right 2017    [facility]    HIP SURGERY Right 2017          TONSILLECTOMY      UPPER GI ENDOSCOPY,DIAGNOSIS  06/15/2012    [facility] Welch. NO REID'S, no repeat rec'd.       Family History   Problem Relation Age of Onset    Other (Other) Father         CVA    Other (Other) Mother         RA    Other (Other) Sister         school fire    Diabetes Brother     Cancer Brother         small cell lung    Cancer Other       Social History     Socioeconomic History    Marital status:     Number of children: 2   Occupational History    Occupation: retired    Tobacco Use    Smoking status: Never    Smokeless tobacco: Never   Vaping Use    Vaping Use: Never used   Substance and Sexual Activity    Alcohol use: Yes     Alcohol/week: 4.0 standard drinks of alcohol     Types: 4  Glasses of wine per week     Comment: social    Drug use: No   Other Topics Concern    Caffeine Concern No     Comment: decaf    Exercise Yes     Comment: walking 3 miles daily    Grew up on a farm No    History of tanning Yes    Outdoor occupation No    Breast feeding No    Reaction to local anesthetic No    Pt has a pacemaker No    Pt has a defibrillator No   Social History Narrative        2 children    Retired - per [employer]                    Colon  - 6/12 - nl    EGD - 6/12 - no Barretts, no f/u nec    bmd - 2/14 - Osteopenia        Current Outpatient Medications   Medication Sig Dispense Refill    Acetaminophen 100 MG/10ML Intravenous Solution Prefilled Syringe       levothyroxine 112 MCG Oral Tab TAKE 1 TABLET BY MOUTH DAILY 90 tablet 1    Ciclopirox 8 % External Solution Apply over nail. Apply nightly over previous coat. After seven (7) days, remove with alcohol, buff nail and continue cycle. 6 mL 8    hydrochlorothiazide 12.5 MG Oral Cap Take 1 capsule (12.5 mg total) by mouth daily. 90 capsule 3    lidocaine-prilocaine 2.5-2.5 % External Cream Apply topically as directed, as needed. 5 g 1    Melatonin 10 MG Oral Cap 1 tablet nightly as needed.        MULTIVITAMIN TAB/CAP Take 1 tablet by mouth daily.      Calcium Carbonate 600 MG Oral Tab Take 1 tablet (600 mg total) by mouth.      albuterol 108 (90 Base) MCG/ACT Inhalation Aero Soln Inhale 2 puffs into the lungs every 4 (four) hours as needed for Wheezing or Shortness of Breath. 1 each 0    naproxen 250 MG Oral Tab Take 1 tablet (250 mg total) by mouth as needed. (Patient not taking: Reported on 3/6/2024)      diclofenac 75 MG Oral Tab EC Take 1 tablet (75 mg total) by mouth 2 (two) times daily. 60 tablet 1     Allergies:   Allergies   Allergen Reactions    Morphine HALLUCINATION    Pcn [Penicillins] SWELLING    Seasonal        Past Medical History:   Diagnosis Date    (+) Bubble Study - PFO or ASD 1/9/2016    Bursitis - right hip 6/8/2011     Disorder of thyroid     H/O Vitamin D Deficiency 2011    Hypothyroid 8/10/2010    Left ventricular diastolic dysfunction with preserved systolic function 8/3/2016    Osteoarthritis     Osteopenia 8/10/2010    Visual impairment      Past Surgical History:   Procedure Laterality Date    APPENDECTOMY      COLONOSCOPY          COLONOSCOPY  2019    hemorrhoids    COLONOSCOPY,DIAGNOSTIC  06/15/2012    [facility] Welch. Hemorrhoids, repeat .    EGD  2017    ESOPHAGEAL MANOMETRY - INTERNAL  2017    borderline nutcracker vs early achalasia    FOOT SURGERY Left     all toes were operated on    H[facility] REPLAC[facility] SURGERY Right 2017    [facility]    HIP SURGERY Right 2017          TONSILLECTOMY      UPPER GI ENDOSCOPY,DIAGNOSIS  06/15/2012    [facility] Welch. NO REID'S, no repeat rec'd.      Social History     Socioeconomic History    Marital status:      Spouse name: Not on file    Number of children: 2    Years of education: Not on file    Highest education level: Not on file   Occupational History    Occupation: retired    Tobacco Use    Smoking status: Never    Smokeless tobacco: Never   Vaping Use    Vaping Use: Never used   Substance and Sexual Activity    Alcohol use: Yes     Alcohol/week: 4.0 standard drinks of alcohol     Types: 4 Glasses of wine per week     Comment: social    Drug use: No    Sexual activity: Not on file   Other Topics Concern     Service Not Asked    Blood Transfusions Not Asked    Caffeine Concern No     Comment: decaf    Occupational Exposure Not Asked    Hobby Hazards Not Asked    Sleep Concern Not Asked    Stress Concern Not Asked    Weight Concern Not Asked    Special Diet Not Asked    Back Care Not Asked    Exercise Yes     Comment: walking 3 miles daily    Bike Helmet Not Asked    Seat Belt Not Asked    Self-Exams Not Asked    Grew up on a farm No    History of tanning Yes    Outdoor occupation No    Breast feeding No    Reaction to local anesthetic  No    Pt has a pacemaker No    Pt has a defibrillator No   Social History Narrative        2 children    Retired - per [employer]                    Colon  - 6/12 - nl    EGD - 6/12 - no Barretts, no f/u nec    bmd - 2/14 - Osteopenia     Social Determinants of Health     Financial Resource Strain: Not on file   Food Insecurity: Not on file   Transportation Needs: Not on file   Physical Activity: Not on file   Stress: Not on file   Social Connections: Not on file   Housing Stability: Not on file     Family History   Problem Relation Age of Onset    Other (Other) Father         CVA    Other (Other) Mother         RA    Other (Other) Sister         school fire    Diabetes Brother     Cancer Brother         small cell lung    Cancer Other        There were no vitals filed for this visit.    HPI:  Chief Complaint   Patient presents with    Full Skin Exam     New pt presents for full body skin exam. No family or personal hx of skin cancer. Two spots of concern today.   Left side of neck - reoccurring within last 2-3 years- tx w/ LN2 in the past.    Right temple- 2-3 weeks- raised, bumpy discoloration- no tx.      Concerns as noted  New patient  History of lesions treated with liquid nitrogen previously    No personal  or family history of skin cancer        Patient presents with concerns above.    Patient has been in their usual state of health.     Past notes/ records and appropriate/relevant lab results including pathology and past body maps reviewed. Including outside notes/ PCP notes as appropriate. Updated and new information noted in current visit.     ROS:  Denies other relevant systemic complaints.      History, medications, allergies reviewed as noted.    Allergies:  Morphine, Pcn [penicillins], and Seasonal     Physical Examination:     Well-developed well-nourished patient alert oriented in no acute distress.  Exam performed, including scalp, head, neck, face,nails, hair, external eyes, including  conjunctival mucosa, eyelids, lips external ears , arms, digits,palms.     Multiple light to medium brown, well marginated, uniformly pigmented, macules and papules 6 mm and less scattered on exam. pigmented lesions examined with dermoscopy benign-appearing patterns.     Waxy tannish keratotic papules scattered, cherry-red vascular papules scattered.    See map today's date for lesions noted .  See assessment and plan below for specific lesions.    Otherwise remarkable for lesions as noted on map.    See A/P  below for additional information:    Assessment / plan:    No orders of the defined types were placed in this encounter.      Meds & Refills for this Visit:  Requested Prescriptions      No prescriptions requested or ordered in this encounter         Encounter Diagnoses   Name Primary?    Inflamed seborrheic keratosis Yes    SK (seborrheic keratosis)     Lentigo     Benign neoplasm of skin, unspecified location     Multiple nevi      Numerous inflamed keratoses over the neck clavicular area upper chest scalp upper back reassurance.    Benign-appearing nevi, no atypical features on dermoscopy reassurance given monitor.     Waxy tan keratotic papules lesions in areas of concern as noted reassurance given.  Benign nature discussed.  Possibility of cryo, alphahydroxy acids over-the-counter retinol's discussed.     No other susupicious lesions on todays  exam.    Please refer to map for specific lesions.  See additional diagnoses.  Pros cons of various therapies, risks benefits discussed.Pathophysiology discussed with patient.  Therapeutic options reviewed.  See  Medications in grid.  Instructions reviewed at length.    Benign nevi, seborrheic  keratoses, cherry angiomas:  Reassurance regarding other benign skin lesions.Signs and symptoms of skin cancer, ABCDE's of melanoma discussed with patient. Sunscreen (broad-spectrum, ideally mineral-based-UVA/UVB -SPF 30 or higher) use encouraged, sun protection/sun  protective clothing, self exams reviewed Followup as noted RTC ---routine checkup    6 mos -one year or p.r.n.    Encounter Times   Including precharting, reviewing chart, prior notes obtaining history: 10 minutes, medical exam :10 minutes, notes on body map, plan, counseling 10minutes My total time spent caring for the patient on the day of the encounter: 30 minutes     The patient indicates understanding of these issues and agrees to the plan.  The patient is asked to return as noted in follow-up/ above.    This note was generated using Dragon voice recognition software.  Please contact me regarding any confusion resulting from errors in recognition..  Note to patient and family: The 21st Century Cures Act makes medical notes like these available to patients. However, be advised this is a medical document. It is intended as [id number] communication and monitoring of a patient's care needs. It is written in medical language and may contain abbreviations or verbiage that are unfamiliar. It may appear blunt or direct. Medical documents are intended to carry relevant information, facts as evident and the clinical opinion of the practitioner.

## 2024-05-01 NOTE — PROGRESS NOTES
PLASTIC AND RECONSTRUCTIVE SURGERY     REASON FOR VISIT/CHIEF COMPLAINT    Risa Anglin is a 41 year old female who presents with complaints of bilateral symptomatic macromastia.    REFERRING PROVIDER  Moshe Baca MD    HISTORY OF PRESENT ILLNESS  Risa Anglin is a 41 year old female who presents to Plastic Surgery Clinic today with complaints referable to her large breasts for the past many years.      Symptoms are reported as the following:    Back and neck pain  - Location - Shoulder, back, and neck  - Severity - Severe  - Frequency - Daily  - Interventions - Tylenol, nonsteroidal anti-inflammatory drugs (Ibuprofen and meloxicam),tizanidine,percocet, bra modification, physical therapy. She has been trying medical and conservative management for years. Despite adequate medical management, her symptoms persist and are worsening.    Shoulder grooving and pain  - Severity - Severe      Skin rashes/Moisture/Odor  - Severity - Severe.  She uses Nystatin powders to treat the area.    Other - She reports wearing a 38 DD size bra.  Despite adequate medical management, her symptoms are progressively worsening and affecting her ability to perform her activities of daily living.      Nipple sensations is present.     She has 6 child(tina), the youngest of which was born in 2018.  She does not plan to have more children in the future.      Smoker/Vaping: No     Blood thinners: No     Diabetes: No    She also is interested in abdominoplasty surgery.  Patient complaints include laxity of the skin, excess fatty tissue diffusely in the abdomen    Current weight - 179 lbs (BMI 30.73)    History of prior abdominal surgery or liposuction - Yes,  in   History of prior cosmetic surgery - No    Associated problems - none    MEDICAL HISTORY  Past Medical History:   Diagnosis Date   • Anemia     with menorrhagia   • Anxiety    • Asthma    • Depression    • History of pre-term labor    • Suicide attempt (CMD) 2012   •  [facility]    Progress Note    Maki Neri Patient Status:  Inpatient    1950 MRN [id number]   Location [facility] 4W/SW/SE Attending Jovita Landeros MD   Hosp Day # 2 PCP Rafiq Cobos MD     Subjective: Syncope    • Urinary tract infection        SURGICAL HISTORY  Past Surgical History:   Procedure Laterality Date   •  section, low transverse     • Echocardiogram     • Tilt table  3/8/2013    Positive - vasodepressive response       MEDICATIONS     Summary of your Discharge Medications          Accurate as of May 1, 2024  1:01 PM. Always use your most recent med list.            Take these Medications      Details   * albuterol 108 (90 Base) MCG/ACT inhaler   Inhale 2 puffs into the lungs every 4 hours as needed.     * albuterol (2.5 MG/3ML) 0.083% nebulizer solution  Commonly known as: VENTOLIN   Take 2.5 mg by nebulization every 6 hours as needed.     budesonide-formoterol 160-4.5 MCG/ACT inhaler  Commonly known as: SYMBICORT   Inhale 2 puffs into the lungs 2 times daily.     fludrocortisone 0.1 MG tablet  Commonly known as: FLORINEF   Take 1 tablet by mouth daily.     LORazepam 1 MG tablet  Commonly known as: ATIVAN   Take 1 mg by mouth every 6 hours as needed.     montelukast 10 MG tablet  Commonly known as: SINGULAIR   Take 10 mg by mouth nightly.     sertraline 50 MG tablet  Commonly known as: ZOLOFT   Take 50 mg by mouth daily.         * This list has 2 medication(s) that are the same as other medications prescribed for you. Read the directions carefully, and ask your doctor or other care provider to review them with you.                ALLERGIES  ALLERGIES:   Allergen Reactions   • Penicillin G      hives   • Seasonal    • Shellfish Allergy   (Food Or Med)      angioedema       SOCIAL HISTORY  Social History     Socioeconomic History   • Marital status: Single     Spouse name: Not on file   • Number of children: 2   • Years of education: Not on file   • Highest education level: Not on file   Occupational History   • Occupation: unemployed   Tobacco Use   • Smoking status: Former     Current packs/day: 0.00     Types: Cigarettes     Quit date: 2003     Years since quittin.3   • Smokeless  tobacco: Never   Substance and Sexual Activity   • Alcohol use: Yes     Comment: occ.   • Drug use: No   • Sexual activity: Yes   Other Topics Concern   • Not on file   Social History Narrative    Lives with children     Social Determinants of Health     Financial Resource Strain: Not on File (2022)    Received from [facility]     Financial Resource Strain    • Financial Resource Strain: 0   Food Insecurity: Not on File (2022)    Received from [facility]     Food Insecurity    • Food: 0   Transportation Needs: Not on File (2022)    Received from [facility]     Transportation Needs    • Transportation: 0   Physical Activity: Not on File (2022)    Received from [facility]     Physical Activity    • Physical Activity: 0   Stress: Not on File (2022)    Received from [facility]     Stress    • Stress: 0   Social Connections: Not on File (2022)    Received from [facility]     Social Connections    • Social Connections and Isolation: 0   Interpersonal Safety: Not on file        FAMILY HISTORY  Family History   Problem Relation Age of Onset   • Stroke Mother         age 40   • Heart Mother    • Other Mother         obesity   • Hypertension Mother    • Cancer Maternal Aunt         Ovarian   • Other Father         liver cirrhosis   • Stroke Maternal Grandfather          age 86   • Genitourinary Sister    • Other Sister         obesity     She reports no family history of breast cancer.    MAMMOGRAPHY HISTORY  Patient reports she has had a mammogram in the last year - No    REVIEW OF SYSTEMS  All pertinent positive and negative ROS (Review of Systems) as outlined in the HPI (History of Present Illess).  Constitutional: Negative for Fever, Weight loss or gain, loss of energy, difficulty performing chores  Eyes: Negative for Dry Eyes, Blurry Vision, or Double Vision  Ears, Nose, and Throat: Negative for Difficulty hearing, ringing in ears, runny nose, or sore throat  Cardiovascular: Negative for Rapid heart rate,  Palpitations, Chest Pain, or Swollen ankles  Respiratory: Negative for Chronic cough, wheezing, or shortness of breath  Gastrointestinal: Negative for abdominal pain, diarrhea, constipation, blood in stool, heart burn, nausea, abdominal bloating  Musculoskeletal: Negative for multiple joint pains, chronic muscle aches, back pain, or neck pain  Skin/breast: Negative for breast pain, breast mass, skin lesion, or rash  Neurological: Negative for Numbness, Tingling, Seizures, or tremor  Mind: Negative   Endocrine: Negative for hot or cold intolerance, unexplained weight loss or gain, abnormal milk production  Hematologic/Lymphatic: Negative for easy bruising or bleeding, or swollen lymph nodes  Allergic/Immunologic: Positive seasonal, penicillin, shellfish    PHYSICAL EXAM  Vitals:    05/01/24 1022   BP: 109/73   Pulse: 89   Temp: 98.3 °F (36.8 °C)     Ht Readings from Last 1 Encounters:   05/01/24 5' 4\" (1.626 m)     Wt Readings from Last 1 Encounters:   05/01/24 81.2 kg (179 lb)     Body mass index is 30.73 kg/m².  Body surface area is 1.87 meters squared.    General Exam - The patient appears her stated age in no apparent distress.  Neurologic - Alert and oriented to person, place, and time.   Psychiatric - Pleasant mood and affect.  Musculoskeletal - Ambulates without the use of an assistive device.    Extremities - No obvious weakness of the upper or lower extremities.  Deep grooves are evident in the location of the bra straps.   HENT -   Normocephalic.  Atraumatic.  Bilateral external ears normal.   Neck - Normal range of motion.   Respiratory - No respiratory distress, breathing comfortably on room air.   Cardiac - Palpable pulses present.  Breast Exam -                  Symmetry - Left > Right                  Ptosis - Grade 3       Other - Excess anterior axillary and lateral chest wall tissue  Sternal notch to nipple  - Right:  36 cm  - Left:  35 cm  Midline to nipple  - Right:  14 cm  - Left:  15 cm  Abdomen  - Soft, non-tender, non-distended.  There is excess skin and tissue diffusely in the abdomen and flanks.  No masses or hernias can be palpated.   They have well healed low transverse scars from previous surgery.     ASSESSMENT  Risa Anglin is a 41 year old female who presents with complaints of bilateral symptomatic macromastia.  Bilateral breast reduction surgery was discussed with the patient.  She would be a good surgical candidate for breast reduction surgery and it should be considered medically necessary.    We discussed the boundaries of the breast and discussed that the excess lateral chest wall tissue is outside the boundaries of the breasts, and therefore is not addressed during breast reduction surgery.  We discussed that liposuction can be done can be done to address excess lateral chest wall tissue during a breast reduction surgery, but is considered cosmetic.  She is interested in possibly having this done and would like to get a quote about cosmetic pricing.     She will need a screening mammogram prior to surgery.    In regards to her abdomen, she would be a candidate for abdominoplasty with 360 liposuction.  Cosmetic pricing will be given for this.     PLAN  -  Clinical photographs were obtained today.  -  Plan bilateral breast reduction estimating 500-550 grams of tissue to be removed from each breast.  - Cosmetic pricing will be given for liposuction of the chest walls  - Primary care examination prior to surgery for preoperative clearance and history and physical.  - If patient proceeds with surgery she will need a second visit in Plastic Surgery Clinic.  At that visit we will make sure all questions are answered and give her preoperative and postoperative instructions and prescriptions.  - Call the clinic with any questions and concerns.      Moshe Baca MD, thank you so much for allowing me the opportunity to assist you in the care of this patient.  Please contact me if you have any  questions or concerns.  I look forward to being of further service to you and will continue to keep you informed of any and all subsequent developments in care.    Iva Laird MD  Plastic and Reconstructive Surgery  [facility]  Pager: [phone]    Total time spent is 80 minutes face-to-face with the patient, reviewing the medical record, test results, and documenting clinical information for today's visit\    PATIENT COUNSELING    Risa Anglin is aware of the expectations of surgery.  The technical aspects of the surgery were discussed in detail including planned incisions, anticipated recovery, time off of work, time to full recovery, what to expect in the postoperative period, schedule of postoperative visits, and potential adverse events.  The patient was also counseled about the benefits and drawbacks of other options for treating breast hypertrophy.  No guarantees are made and all alternatives are given.  In additions, she was advised declining surgical intervention is always an option.    General risks associated with surgery were discussed with the patient.  They include but are not limited to anesthesia, deep vein thrombosis, pulmonary embolus, injury to blood vessels and nerves, paralysis, stroke, heart attack, and death.    Potential adverse events more specifically associated with breast reduction surgery were also discussed in detail and could include, but are not confined to all of the following:    Bleeding - Significant blood loss resulting in anemia may occur.  Iron therapy is prescribed postoperatively if needed.     Infection - Risk of infection after surgery was described.  Delayed healing and more prominent scars may result.     Hematoma/Seroma - A fluid collection may develop postoperatively.  If it is blood, it is a hematoma.  If it is straw colored fluid it is a seroma.  These fluid collections may  need to be drained in the clinic or in the operating room.  Fluid collections may need to be drained/aspirated multiple times.    Scars - Significant scars will be present after surgery.  The location and length of the scars was diagrammed for the patient.  The fact that scars may become wide and/or hypertrophic was explained.  The slow maturation of scars was described.  She was told that this is by far the most common cause of patient dissatisfaction after surgery.  She will be exchanging large symptomatic breasts for significant and permanent scars.    Asymmetry - Every effort is made to create symmetrical breasts, but some difference in the size and shape of the breast may be noted after surgery.    Difficulty breast feeding - The patient was told that there is a possibility that she may not be able to breast feed after surgery.    Skin loss - Areas of skin blistering or even loss of skin may occur after surgery.  The risk is significantly higher in current or past smokers.  All smokers MUST quit smoking a full month prior to surgery and continue not to smoke 1 month after surgery.  Delayed healing, possible repeat surgery, and increased scarring may result.    Numbness - All patients experience numbness following surgery.  Sensation improves over a period of months.  Occasionally, patients may have significant and permanent loss of sensation in the breast.  Rarely, patients describe skin hypersensitivity or pain after surgery.    Nipple Areolar Complex (NAC) Loss - Rarely, NAC loss can occur.  In this instance, the NAC would need to be debrided and allowed to heal.  Additional procedures would need to be performed to re-create the NAC.    Changes in the breast after surgery - The breasts will be smaller and less ptotic than before surgery.  With time, the ptosis in the breast may recur.  Significant weight gain or loss will affect the size of the breasts.  Pregnancy will increase the size of the breasts and the  final effect will be a more ptotic breast that may be significantly larger or smaller than prior to pregnancy.    I had a lengthy discussion with the patient concerning abdominal surgery in this situation.       1) Panniculectomy - Surgery and typical recovery was explained.  Excision of the hanging tissue without undermining or advancement will eliminate the hanging tissue which is causing the symptoms.  May be covered by insurance.  This procedure has NO effect on the mid or upper abdomen.  The typical result was described and diagrammed.      2) Abdominoplasty/Panniculectomy - Wide undermining of the abdomen with vigorous advancement allows for excision of a much more significant amount of tissue that results in a more significant improvement of the entire abdomen from a cosmetic point of view.  This additional surgery is NOT covered by insurance and some out of pocket expense would be necessary to cover the additional surgical, anesthesia, and facility charges.      3) Body Lift - A circumferential excision is performed to add a flank and buttock lift to the components described above.  Essentially two operations are performed (one anterior, and then an immediate added posterior operation).  This additional surgery is NOT covered by insurance, and significant added expense will result not only for surgeon's fees, but also operating room and anesthesia charges.      Risa Anglin is aware of the expectations of surgery.  The technical aspects of the surgery were discussed in detail including anticipated recovery, time off of work, time away from hobbies, and potential adverse events.  The patient was also counseled about the benefits and drawbacks of other options for abdominal re-contouring.  No guarantees are made and all alternatives are given.  Of course she was advised declining surgical intervention is always an option.     General risks associated with surgery were discussed with the patient.  They  include but are not limited to anesthesia, deep vein thrombosis, pulmonary embolus, injury to blood vessels and nerves, paralysis, stroke, heart attack, and death.     Potential adverse events more specifically associated with abdominal contouring surgery were also discussed in detail and could include, but are not confined to all of the following:    - Bleeding - Significant blood loss may occur.  Rarely reoperation may be needed to control postoperative bleeding.  Postop iron therapy is possible.      - Infection - Can occur.  Delayed healing and increased scarring may result.  Very rarely a large open wound and very prolonged healing or need for reoperation may result.    - Scars - A lengthy scar will result.  Scars may be wide or hypertrophic.  The location of the scar was diagramed and demonstrated on her own body.    - Drainage - Surgical drains are used and may remain in place for 1- 3 weeks.  Longer periods of drain use are uncommon but possible.  Aspiration of fluid may have to be performed.    - Contour Deformity - Complete correction of the contour may not be possible.  Areas of persistent laxity were explained, especially laterally.  Revisional surgery is occasionally required.  Abdominal contour is expected to relax over time.  Abdominal contour will also change in the future depending upon weight loss or weight gain.      - Numbness - All patients experience numbness after surgery.  This improves over a period of months.  Permanent numbness in the lower abdomen may occur.

## 2024-07-16 ENCOUNTER — LAB ENCOUNTER (OUTPATIENT)
Dept: LAB | Age: 74
End: 2024-07-16
Attending: INTERNAL MEDICINE
Payer: MEDICARE

## 2024-07-16 ENCOUNTER — OFFICE VISIT (OUTPATIENT)
Dept: OTOLARYNGOLOGY | Facility: CLINIC | Age: 74
End: 2024-07-16
Payer: COMMERCIAL

## 2024-07-16 VITALS — WEIGHT: 156 LBS | HEIGHT: 63 IN | BODY MASS INDEX: 27.64 KG/M2

## 2024-07-16 DIAGNOSIS — R09.81 NASAL CONGESTION: ICD-10-CM

## 2024-07-16 DIAGNOSIS — H61.23 CERUMEN DEBRIS ON TYMPANIC MEMBRANE OF BOTH EARS: ICD-10-CM

## 2024-07-16 DIAGNOSIS — E03.9 ACQUIRED HYPOTHYROIDISM: Primary | ICD-10-CM

## 2024-07-16 DIAGNOSIS — E03.9 ACQUIRED HYPOTHYROIDISM: ICD-10-CM

## 2024-07-16 DIAGNOSIS — R05.3 CHRONIC COUGH: ICD-10-CM

## 2024-07-16 LAB
T3FREE SERPL-MCNC: 2.49 PG/ML (ref 2.4–4.2)
T4 FREE SERPL-MCNC: 1.5 NG/DL (ref 0.8–1.7)

## 2024-07-16 PROCEDURE — 84439 ASSAY OF FREE THYROXINE: CPT

## 2024-07-16 PROCEDURE — 84481 FREE ASSAY (FT-3): CPT

## 2024-07-16 PROCEDURE — 99203 OFFICE O/P NEW LOW 30 MIN: CPT | Performed by: SPECIALIST

## 2024-07-16 PROCEDURE — 36415 COLL VENOUS BLD VENIPUNCTURE: CPT

## 2024-07-16 RX ORDER — MONTELUKAST SODIUM 10 MG/1
10 TABLET ORAL NIGHTLY
Qty: 30 TABLET | Refills: 3 | Status: SHIPPED | OUTPATIENT
Start: 2024-07-16

## 2024-07-16 RX ORDER — AZELASTINE 1 MG/ML
2 SPRAY, METERED NASAL 2 TIMES DAILY
Qty: 30 ML | Refills: 5 | Status: SHIPPED | OUTPATIENT
Start: 2024-07-16

## 2024-07-16 NOTE — PATIENT INSTRUCTIONS
You had nasal congestion and chronic cough.  I placed on a trial of Singulair and Astelin nasal spray.  Follow-up in 3 weeks time, sooner if problems.  Cerumen was fully cleaned from both your ears.  A T3 and T4 was ordered to evaluate your hypothyroidism

## 2024-07-16 NOTE — PROGRESS NOTES
So Wilde is a 73 year old female.   Chief Complaint   Patient presents with    Sinus Problem     Problems with mucus buildup since October 2023     HPI:   Patient here with mucoid postnasal drainage and cough.  Also feels that she is gaining weight.    Current Outpatient Medications   Medication Sig Dispense Refill    montelukast 10 MG Oral Tab Take 1 tablet (10 mg total) by mouth nightly. 30 tablet 3    azelastine 0.1 % Nasal Solution 2 sprays by Nasal route 2 (two) times daily. 30 mL 5    Acetaminophen 100 MG/10ML Intravenous Solution Prefilled Syringe       levothyroxine 112 MCG Oral Tab TAKE 1 TABLET BY MOUTH DAILY 90 tablet 1    naproxen 250 MG Oral Tab Take 1 tablet (250 mg total) by mouth as needed.      hydrochlorothiazide 12.5 MG Oral Cap Take 1 capsule (12.5 mg total) by mouth daily. 90 capsule 3    lidocaine-prilocaine 2.5-2.5 % External Cream Apply topically as directed, as needed. 5 g 1    Melatonin 10 MG Oral Cap 1 tablet nightly as needed.        MULTIVITAMIN TAB/CAP Take 1 tablet by mouth daily.      Calcium Carbonate 600 MG Oral Tab Take 1 tablet (600 mg total) by mouth.      albuterol 108 (90 Base) MCG/ACT Inhalation Aero Soln Inhale 2 puffs into the lungs every 4 (four) hours as needed for Wheezing or Shortness of Breath. 1 each 0    Ciclopirox 8 % External Solution Apply over nail. Apply nightly over previous coat. After seven (7) days, remove with alcohol, buff nail and continue cycle. 6 mL 8    diclofenac 75 MG Oral Tab EC Take 1 tablet (75 mg total) by mouth 2 (two) times daily. 60 tablet 1      Past Medical History:    (+) Bubble Study - PFO or ASD    Bursitis - right hip    Disorder of thyroid    H/O Vitamin D Deficiency    Hypothyroid    Left ventricular diastolic dysfunction with preserved systolic function    Osteoarthritis    Osteopenia    Visual impairment      Social History:  Social History     Socioeconomic History    Marital status:     Number of children: 2    Occupational History    Occupation: retired    Tobacco Use    Smoking status: Never    Smokeless tobacco: Never   Vaping Use    Vaping status: Never Used   Substance and Sexual Activity    Alcohol use: Yes     Alcohol/week: 4.0 standard drinks of alcohol     Types: 4 Glasses of wine per week     Comment: social    Drug use: No   Other Topics Concern    Caffeine Concern No     Comment: decaf    Exercise Yes     Comment: walking 3 miles daily    Grew up on a farm No    History of tanning Yes    Outdoor occupation No    Breast feeding No    Reaction to local anesthetic No    Pt has a pacemaker No    Pt has a defibrillator No   Social History Narrative        2 children    Retired - per [employer]                    Colon  - 6/12 - nl    EGD - 6/12 - no Barretts, no f/u nec    bmd - 2/14 - Osteopenia        REVIEW OF SYSTEMS:   GENERAL HEALTH: feels well otherwise  GENERAL : denies fever, chills, sweats, weight loss, weight gain  SKIN: denies any unusual skin lesions or rashes  RESPIRATORY: denies shortness of breath with exertion  NEURO: denies headaches    EXAM:   Ht 5' 3\" (1.6 m)   Wt 156 lb (70.8 kg)   BMI 27.63 kg/m²   System Details   Skin Inspection - Normal.   Constitutional Overall appearance - Normal.   Head/Face Facial features - Normal. Eyebrows - Normal. Skull - Normal.   Eyes Conjunctiva - Right: Normal, Left: Normal. Pupil - Right: Normal, Left: Normal.    Ears Inspection - Right: Normal, Left: Normal.   Ears = bilateral cerumen occlussions.    Fully cleaned under microscope using instrumentation and suctioning.    Normal tympanic membranes.     Nasal External nose - Normal.   Nasal septum - Normal.  Turbinates -congestion, no purulence or polyps noted   Oral/Oropharynx Lips - Normal, Tonsils -absent, tongue - Normal    Neck Exam Inspection - Normal. Palpation - Normal. Parotid gland - Normal. Thyroid gland - Normal.   Lymph Detail Submental. Submandibular. Anterior  cervical. Posterior cervical. Supraclavicular all without enlargement   Lungs Clear to auscultation   Psychiatric Orientation - Oriented to time, place, person & situation. Appropriate mood and affect.   Neurological Memory - Normal. Cranial nerves - Cranial nerves II through XII grossly intact.     ASSESSMENT AND PLAN:   1. Acquired hypothyroidism  TSH 0.991 done on 7/8/2023.  - Free T3 (Triiodothryronine); Future  - Free T4, (Free Thyroxine); Future    2. Nasal congestion  Nasal congestion no purulence or polyps noted.  Patient placed on trial of Astelin nasal spray.    3. Chronic cough  Clear to auscultation    4. Cerumen debris on tympanic membrane of both ears  Fully cleaned.  Follow-up in 3 weeks time, sooner if problems.      The patient indicates understanding of these issues and agrees to the plan.      Joaquina Calhoun MD  7/16/2024  8:32 AM

## 2024-08-06 ENCOUNTER — LAB ENCOUNTER (OUTPATIENT)
Dept: LAB | Age: 74
End: 2024-08-06
Attending: SPECIALIST
Payer: MEDICARE

## 2024-08-06 ENCOUNTER — OFFICE VISIT (OUTPATIENT)
Dept: OTOLARYNGOLOGY | Facility: CLINIC | Age: 74
End: 2024-08-06

## 2024-08-06 VITALS — HEIGHT: 63 IN | WEIGHT: 156 LBS | BODY MASS INDEX: 27.64 KG/M2

## 2024-08-06 DIAGNOSIS — J34.3 NASAL TURBINATE HYPERTROPHY: ICD-10-CM

## 2024-08-06 DIAGNOSIS — R09.82 ALLERGIC RHINITIS WITH POSTNASAL DRIP: ICD-10-CM

## 2024-08-06 DIAGNOSIS — J30.9 ALLERGIC RHINITIS WITH POSTNASAL DRIP: Primary | ICD-10-CM

## 2024-08-06 DIAGNOSIS — R09.82 ALLERGIC RHINITIS WITH POSTNASAL DRIP: Primary | ICD-10-CM

## 2024-08-06 DIAGNOSIS — J30.9 ALLERGIC RHINITIS WITH POSTNASAL DRIP: ICD-10-CM

## 2024-08-06 PROCEDURE — 36415 COLL VENOUS BLD VENIPUNCTURE: CPT

## 2024-08-06 PROCEDURE — 99213 OFFICE O/P EST LOW 20 MIN: CPT | Performed by: SPECIALIST

## 2024-08-06 PROCEDURE — 82785 ASSAY OF IGE: CPT

## 2024-08-06 PROCEDURE — 86003 ALLG SPEC IGE CRUDE XTRC EA: CPT

## 2024-08-06 NOTE — PATIENT INSTRUCTIONS
You were sent for allergy testing to better evaluate your allergic triggers.  In the meantime, please continue Singulair and Astelin nasal spray.  I will of course notify you of all results.

## 2024-08-06 NOTE — PROGRESS NOTES
So Wilde is a 73 year old female.   Chief Complaint   Patient presents with    Follow - Up     acquired hypothyroidism       HPI:   Patient here still has some voice disturbance however feels better on the montelukast and Astelin nasal sprays.    Current Outpatient Medications   Medication Sig Dispense Refill    montelukast 10 MG Oral Tab Take 1 tablet (10 mg total) by mouth nightly. 30 tablet 3    azelastine 0.1 % Nasal Solution 2 sprays by Nasal route 2 (two) times daily. 30 mL 5    Acetaminophen 100 MG/10ML Intravenous Solution Prefilled Syringe       levothyroxine 112 MCG Oral Tab TAKE 1 TABLET BY MOUTH DAILY 90 tablet 1    naproxen 250 MG Oral Tab Take 1 tablet (250 mg total) by mouth as needed.      hydrochlorothiazide 12.5 MG Oral Cap Take 1 capsule (12.5 mg total) by mouth daily. 90 capsule 3    lidocaine-prilocaine 2.5-2.5 % External Cream Apply topically as directed, as needed. 5 g 1    Melatonin 10 MG Oral Cap 1 tablet nightly as needed.        MULTIVITAMIN TAB/CAP Take 1 tablet by mouth daily.      Calcium Carbonate 600 MG Oral Tab Take 1 tablet (600 mg total) by mouth.      albuterol 108 (90 Base) MCG/ACT Inhalation Aero Soln Inhale 2 puffs into the lungs every 4 (four) hours as needed for Wheezing or Shortness of Breath. 1 each 0    Ciclopirox 8 % External Solution Apply over nail. Apply nightly over previous coat. After seven (7) days, remove with alcohol, buff nail and continue cycle. 6 mL 8    diclofenac 75 MG Oral Tab EC Take 1 tablet (75 mg total) by mouth 2 (two) times daily. 60 tablet 1      Past Medical History:    (+) Bubble Study - PFO or ASD    Bursitis - right hip    Disorder of thyroid    H/O Vitamin D Deficiency    Hypothyroid    Left ventricular diastolic dysfunction with preserved systolic function    Osteoarthritis    Osteopenia    Visual impairment      Social History:  Social History     Socioeconomic History    Marital status:     Number of children: 2   Occupational  History    Occupation: retired    Tobacco Use    Smoking status: Never    Smokeless tobacco: Never   Vaping Use    Vaping status: Never Used   Substance and Sexual Activity    Alcohol use: Yes     Alcohol/week: 4.0 standard drinks of alcohol     Types: 4 Glasses of wine per week     Comment: social    Drug use: No   Other Topics Concern    Caffeine Concern No     Comment: decaf    Exercise Yes     Comment: walking 3 miles daily    Grew up on a farm No    History of tanning Yes    Outdoor occupation No    Breast feeding No    Reaction to local anesthetic No    Pt has a pacemaker No    Pt has a defibrillator No   Social History Narrative        2 children    Retired - per [employer]                    Colon  - 6/12 - nl    EGD - 6/12 - no Barretts, no f/u nec    bmd - 2/14 - Osteopenia        REVIEW OF SYSTEMS:   GENERAL HEALTH: feels well otherwise  GENERAL : denies fever, chills, sweats, weight loss, weight gain  SKIN: denies any unusual skin lesions or rashes  RESPIRATORY: denies shortness of breath with exertion  NEURO: denies headaches    EXAM:   Ht 5' 3\" (1.6 m)   Wt 156 lb (70.8 kg)   BMI 27.63 kg/m²   System Details   Skin Inspection - Normal.   Constitutional Overall appearance - Normal.   Head/Face Facial features - Normal. Eyebrows - Normal. Skull - Normal.   Eyes Conjunctiva - Right: Normal, Left: Normal. Pupil - Right: Normal, Left: Normal.    Ears Inspection - Right: Normal, Left: Normal.   Canal - Right: Normal, Left: Normal.   TM - Right: Normal, Left: Normal.   Nasal External nose - Normal.   Nasal septum - Normal.  Turbinates -significant congestion and clear postnasal drip.  No polyps by speculum examination.   Oral/Oropharynx Lips - Normal, Tonsils - Normal, Tongue - Normal    Larynx Normal vocal cord mobility, no retained secretions, no tumors or masses seen.  All by mirror examination.   Neck Exam Inspection - Normal. Palpation - Normal. Parotid gland - Normal.  Thyroid gland - Normal.   Lymph Detail Submental. Submandibular. Anterior cervical. Posterior cervical. Supraclavicular all without enlargement   Psychiatric Orientation - Oriented to time, place, person & situation. Appropriate mood and affect.   Neurological Memory - Normal. Cranial nerves - Cranial nerves II through XII grossly intact.     ASSESSMENT AND PLAN:   1. Allergic rhinitis with postnasal drip  Patient sent for allergy testing.  Continue Singulair and Astelin nasal sprays in the meantime.  Will try to see if the triggers can be better delineated.  - Allergens, Zone 8; Future    2. Nasal turbinate hypertrophy  Pale turbinates consistent with allergic rhinitis.  Cannot rule out vasomotor rhinitis either.      The patient indicates understanding of these issues and agrees to the plan.      Joaquina Calhoun MD  8/6/2024  9:58 AM

## 2024-08-08 ENCOUNTER — PATIENT MESSAGE (OUTPATIENT)
Dept: OTOLARYNGOLOGY | Facility: CLINIC | Age: 74
End: 2024-08-08

## 2024-08-08 LAB

## 2024-08-08 NOTE — PROGRESS NOTES
Allergy testing negative.  Can stop the singulair.  Continue the astelin nasal spray which will work for allergic or vasomotor rhinitis.

## 2024-08-09 NOTE — TELEPHONE ENCOUNTER
From: So Wilde  To: Joaquina Calhoun  Sent: 8/8/2024 6:31 PM CDT  Subject: next visit    Should I make an appointment for a next visit?  So Wilde

## 2024-08-11 ENCOUNTER — PATIENT MESSAGE (OUTPATIENT)
Dept: OTOLARYNGOLOGY | Facility: CLINIC | Age: 74
End: 2024-08-11

## 2024-08-12 NOTE — TELEPHONE ENCOUNTER
From: So Wilde  To: Joaquina Calhoun  Sent: 8/11/2024 7:54 AM CDT  Subject: resend message    Could you please resend the message about what prescriptions I should be taking. I can't find it in messages. It's about stopping the oral meds and just doing the nasal spray. But because I can't find the message I'm not sure if this is correct.  Thanks

## 2024-10-15 ENCOUNTER — LAB ENCOUNTER (OUTPATIENT)
Dept: LAB | Facility: HOSPITAL | Age: 74
End: 2024-10-15
Attending: ORTHOPAEDIC SURGERY
Payer: MEDICARE

## 2024-10-15 DIAGNOSIS — Z96.641 HISTORY OF TOTAL HIP ARTHROPLASTY, RIGHT: Primary | ICD-10-CM

## 2024-10-15 LAB
CRP SERPL-MCNC: 0.8 MG/DL (ref ?–1)
ERYTHROCYTE [SEDIMENTATION RATE] IN BLOOD: 25 MM/HR

## 2024-10-15 PROCEDURE — 36415 COLL VENOUS BLD VENIPUNCTURE: CPT

## 2024-10-15 PROCEDURE — 85652 RBC SED RATE AUTOMATED: CPT

## 2024-10-15 PROCEDURE — 86140 C-REACTIVE PROTEIN: CPT

## 2024-10-22 ENCOUNTER — HOSPITAL ENCOUNTER (OUTPATIENT)
Dept: CT IMAGING | Facility: HOSPITAL | Age: 74
Discharge: HOME | End: 2024-10-22
Attending: ORTHOPAEDIC SURGERY
Payer: MEDICARE

## 2024-10-22 DIAGNOSIS — Z96.641 PRESENCE OF RIGHT ARTIFICIAL HIP JOINT: ICD-10-CM

## 2024-10-22 PROCEDURE — 73700 CT LOWER EXTREMITY W/O DYE: CPT | Performed by: ORTHOPAEDIC SURGERY

## (undated) DEVICE — STERILE POLYISOPRENE POWDER-FREE SURGICAL GLOVES: Brand: PROTEXIS

## (undated) DEVICE — STERILE LATEX POWDER-FREE SURGICAL GLOVESWITH NITRILE COATING: Brand: PROTEXIS

## (undated) DEVICE — 3M™ STERI-DRAPE™ INSTRUMENT POUCH 1018: Brand: STERI-DRAPE™

## (undated) DEVICE — SUTURE ETHIBOND 5-0 MB46G

## (undated) DEVICE — GOWN SURG AERO BLUE PERF LG

## (undated) DEVICE — FAN SPRAY KIT: Brand: PULSAVAC®

## (undated) DEVICE — SUTURE VICRYL 1-0 J977H

## (undated) DEVICE — 1010 S-DRAPE TOWEL DRAPE 10/BX: Brand: STERI-DRAPE™

## (undated) DEVICE — SUTURE ETHIBOND 1 CT-1

## (undated) DEVICE — SPLINT ORTH UNV HIP PD CUP LG

## (undated) DEVICE — 3M™ COBAN™ NL STERILE NON-LATEX SELF-ADHERENT WRAP, 2084S, 4 IN X 5 YD (10 CM X 4,5 M), 18 ROLLS/CASE: Brand: 3M™ COBAN™

## (undated) DEVICE — PACK CDS TOTAL HIP

## (undated) DEVICE — CHLORAPREP ORANGE TINT 10.5ML

## (undated) DEVICE — CONTAINER SPEC STR 4OZ GRY LID

## (undated) DEVICE — SUTURE VLOC 90 3-0 23\" 0034

## (undated) DEVICE — HEWSON SUTURE RETRIEVER: Brand: HEWSON SUTURE RETRIEVER

## (undated) DEVICE — BATTERY

## (undated) DEVICE — T5 HOOD WITH PEEL AWAY FACE SHIELD

## (undated) DEVICE — COVER SGL STRL LGHT HNDL BLU

## (undated) DEVICE — CAUTERY TIP BLADE EXTENSION

## (undated) DEVICE — DRESSING 10X4IN ANMC SAFETAC

## (undated) DEVICE — CHLORAPREP 26ML APPLICATOR

## (undated) DEVICE — DRAPE SRG 70X60IN SPLT U IMPRV

## (undated) DEVICE — SOL  .9 3000ML

## (undated) DEVICE — BLADE SAW SAGITTAL 19.5